# Patient Record
Sex: FEMALE | Employment: OTHER | URBAN - NONMETROPOLITAN AREA
[De-identification: names, ages, dates, MRNs, and addresses within clinical notes are randomized per-mention and may not be internally consistent; named-entity substitution may affect disease eponyms.]

---

## 2021-04-03 LAB
BUN BLDV-MCNC: 39 MG/DL
CALCIUM SERPL-MCNC: 8.6 MG/DL
CHLORIDE BLD-SCNC: 104 MMOL/L
CO2: 25 MMOL/L
CREAT SERPL-MCNC: 1.2 MG/DL
GFR CALCULATED: 47
GLUCOSE BLD-MCNC: 107 MG/DL
POTASSIUM SERPL-SCNC: 4.3 MMOL/L
SODIUM BLD-SCNC: 138 MMOL/L

## 2021-04-21 LAB
BASOPHILS ABSOLUTE: ABNORMAL
BASOPHILS RELATIVE PERCENT: ABNORMAL
EOSINOPHILS ABSOLUTE: ABNORMAL
EOSINOPHILS RELATIVE PERCENT: ABNORMAL
HCT VFR BLD CALC: 36.4 % (ref 36–46)
HEMOGLOBIN: 10.9 G/DL (ref 12–16)
LYMPHOCYTES ABSOLUTE: ABNORMAL
LYMPHOCYTES RELATIVE PERCENT: ABNORMAL
MCH RBC QN AUTO: ABNORMAL PG
MCHC RBC AUTO-ENTMCNC: ABNORMAL G/DL
MCV RBC AUTO: ABNORMAL FL
MONOCYTES ABSOLUTE: ABNORMAL
MONOCYTES RELATIVE PERCENT: ABNORMAL
NEUTROPHILS ABSOLUTE: ABNORMAL
NEUTROPHILS RELATIVE PERCENT: ABNORMAL
PLATELET # BLD: 246 K/ΜL
PMV BLD AUTO: ABNORMAL FL
POTASSIUM (K+): 3.6
RBC # BLD: 4.14 10^6/ΜL
WBC # BLD: 7.6 10^3/ML

## 2021-04-23 ENCOUNTER — OFFICE VISIT (OUTPATIENT)
Dept: NEPHROLOGY | Age: 76
End: 2021-04-23
Payer: MEDICARE

## 2021-04-23 VITALS
TEMPERATURE: 97.2 F | SYSTOLIC BLOOD PRESSURE: 139 MMHG | HEART RATE: 86 BPM | DIASTOLIC BLOOD PRESSURE: 84 MMHG | OXYGEN SATURATION: 96 % | WEIGHT: 93.6 LBS

## 2021-04-23 DIAGNOSIS — I10 HTN (HYPERTENSION), BENIGN: Primary | ICD-10-CM

## 2021-04-23 DIAGNOSIS — N18.30 STAGE 3 CHRONIC KIDNEY DISEASE, UNSPECIFIED WHETHER STAGE 3A OR 3B CKD (HCC): Primary | ICD-10-CM

## 2021-04-23 DIAGNOSIS — N18.32 STAGE 3B CHRONIC KIDNEY DISEASE (HCC): ICD-10-CM

## 2021-04-23 PROCEDURE — G8427 DOCREV CUR MEDS BY ELIG CLIN: HCPCS | Performed by: INTERNAL MEDICINE

## 2021-04-23 PROCEDURE — 99213 OFFICE O/P EST LOW 20 MIN: CPT | Performed by: INTERNAL MEDICINE

## 2021-04-23 PROCEDURE — 3017F COLORECTAL CA SCREEN DOC REV: CPT | Performed by: INTERNAL MEDICINE

## 2021-04-23 PROCEDURE — 1090F PRES/ABSN URINE INCON ASSESS: CPT | Performed by: INTERNAL MEDICINE

## 2021-04-23 PROCEDURE — 4004F PT TOBACCO SCREEN RCVD TLK: CPT | Performed by: INTERNAL MEDICINE

## 2021-04-23 PROCEDURE — 4040F PNEUMOC VAC/ADMIN/RCVD: CPT | Performed by: INTERNAL MEDICINE

## 2021-04-23 PROCEDURE — G8400 PT W/DXA NO RESULTS DOC: HCPCS | Performed by: INTERNAL MEDICINE

## 2021-04-23 PROCEDURE — G8421 BMI NOT CALCULATED: HCPCS | Performed by: INTERNAL MEDICINE

## 2021-04-23 PROCEDURE — 1123F ACP DISCUSS/DSCN MKR DOCD: CPT | Performed by: INTERNAL MEDICINE

## 2021-04-23 RX ORDER — ACETAMINOPHEN 325 MG/1
325 TABLET ORAL EVERY 4 HOURS
COMMUNITY

## 2021-04-23 RX ORDER — FUROSEMIDE 20 MG/1
20 TABLET ORAL DAILY
COMMUNITY
End: 2021-10-13

## 2021-04-23 RX ORDER — PANTOPRAZOLE SODIUM 40 MG/1
40 TABLET, DELAYED RELEASE ORAL DAILY
COMMUNITY
End: 2021-10-13

## 2021-04-23 RX ORDER — TEMAZEPAM 30 MG/1
30 CAPSULE ORAL NIGHTLY PRN
COMMUNITY
Start: 2020-12-28 | End: 2021-10-13

## 2021-04-23 RX ORDER — AMIODARONE HYDROCHLORIDE 200 MG/1
200 TABLET ORAL DAILY
COMMUNITY

## 2021-04-23 RX ORDER — DULOXETIN HYDROCHLORIDE 60 MG/1
60 CAPSULE, DELAYED RELEASE ORAL DAILY
COMMUNITY
Start: 2020-12-28

## 2021-04-23 RX ORDER — DULOXETIN HYDROCHLORIDE 30 MG/1
30 CAPSULE, DELAYED RELEASE ORAL DAILY
COMMUNITY
Start: 2020-12-28

## 2021-04-23 RX ORDER — NICOTINE 21 MG/24HR
1 PATCH, TRANSDERMAL 24 HOURS TRANSDERMAL EVERY 24 HOURS
COMMUNITY
End: 2021-10-13

## 2021-04-23 NOTE — PROGRESS NOTES
Labs yesterday  [facility]  No med list. meds were pulled from pharmacy as of 4/22

## 2021-04-23 NOTE — PROGRESS NOTES
[facility]        Outpatient Follow-Up note         4/23/2021 9:36 AM    Patient Name:   Bienvenido Lemus:    1945  Primary Care Physician:  Pasquale Hutson [address] [place] [id number]  Dept: [phone]  Loc: [phone]     Chief Complaint / Reason for follow-up : Follow Up of CKD and recent hospital discharge     Interval History :  Patient seen and examined by me. Feels well denies any complaints no chest pain or significant shortness of breath  She was recently in the hospital for acute kidney injury, she was in urinary retention and also hypotension and anemia. Past History :  Past Medical History:   Diagnosis Date    Anemia     Heart abnormality     Hypertension      Past Surgical History:   Procedure Laterality Date    HEART CHAMBER REVISION      WRIST SURGERY          Medications :     Outpatient Medications Marked as Taking for the 4/23/21 encounter (Office Visit) with Amada Peck MD   Medication Sig Dispense Refill    acetaminophen (TYLENOL) 325 MG tablet Take 325 mg by mouth every 4 hours      amiodarone (CORDARONE) 200 MG tablet Take 200 mg by mouth daily      apixaban (ELIQUIS) 2.5 MG TABS tablet Take 2.5 mg by mouth 2 times daily      dilTIAZem (CARDIZEM) 90 MG tablet Take 90 mg by mouth 3 times daily      DULoxetine (CYMBALTA) 30 MG extended release capsule Take 30 mg by mouth daily      DULoxetine (CYMBALTA) 60 MG extended release capsule Take 60 mg by mouth daily Takes w 30 mg      furosemide (LASIX) 20 MG tablet Take 20 mg by mouth daily      nicotine (NICODERM CQ) 21 MG/24HR Place 1 patch onto the skin every 24 hours      pantoprazole (PROTONIX) 40 MG tablet Take 40 mg by mouth daily      temazepam (RESTORIL) 30 MG capsule Take 30 mg by mouth nightly as needed.          Vitals     /84 (Site: Right Upper Arm, Position: Sitting,

## 2021-04-26 ENCOUNTER — TELEPHONE (OUTPATIENT)
Dept: NEPHROLOGY | Age: 76
End: 2021-04-26

## 2021-04-26 NOTE — TELEPHONE ENCOUNTER
Call placed to [facility] and CMP was not added to labs from 4/21. Would you like me to contact patient to see if they could have lab drawn? Please advise.

## 2021-04-26 NOTE — TELEPHONE ENCOUNTER
Spoke to daughter. They will have a CMP drawn on Wednesday or Thursday in [facility].

## 2021-04-29 LAB
ALBUMIN SERPL-MCNC: 3.4 G/DL
ALP BLD-CCNC: 168 U/L
ALT SERPL-CCNC: 14 U/L
ANION GAP SERPL CALCULATED.3IONS-SCNC: NORMAL MMOL/L
AST SERPL-CCNC: 23 U/L
BILIRUB SERPL-MCNC: 0.3 MG/DL (ref 0.1–1.4)
BUN BLDV-MCNC: 16 MG/DL
CALCIUM SERPL-MCNC: 8.4 MG/DL
CHLORIDE BLD-SCNC: 104 MMOL/L
CO2: 25 MMOL/L
CREAT SERPL-MCNC: 1.1 MG/DL
GFR CALCULATED: 52
GLUCOSE BLD-MCNC: 83 MG/DL
POTASSIUM SERPL-SCNC: 3 MMOL/L
SODIUM BLD-SCNC: 141 MMOL/L
TOTAL PROTEIN: 5.8

## 2021-05-03 ENCOUNTER — TELEPHONE (OUTPATIENT)
Dept: NEPHROLOGY | Age: 76
End: 2021-05-03

## 2021-05-03 DIAGNOSIS — E87.6 HYPOKALEMIA: Primary | ICD-10-CM

## 2021-05-03 RX ORDER — POTASSIUM CHLORIDE 1.5 G/1.77G
20 POWDER, FOR SOLUTION ORAL 2 TIMES DAILY
COMMUNITY
End: 2021-10-13

## 2021-05-03 NOTE — TELEPHONE ENCOUNTER
----- Message from Maricruz Rod MD sent at 4/30/2021  1:49 PM EDT -----  k is low increase kcl to 20 BID.   Repeat K in 1 week

## 2021-05-03 NOTE — TELEPHONE ENCOUNTER
Spoke to [employer], they will start potassium again and have labs completed in one week. Usually use [facility].  Mailed lab order to home address

## 2021-10-08 LAB
ALBUMIN SERPL-MCNC: 4.1 G/DL
ALP BLD-CCNC: 110 U/L
ALT SERPL-CCNC: 11 U/L
ANION GAP SERPL CALCULATED.3IONS-SCNC: NORMAL MMOL/L
AST SERPL-CCNC: 11 U/L
BILIRUB SERPL-MCNC: 0.4 MG/DL (ref 0.1–1.4)
BUN BLDV-MCNC: 23 MG/DL
CALCIUM SERPL-MCNC: 9 MG/DL
CHLORIDE BLD-SCNC: 104 MMOL/L
CO2: 28 MMOL/L
CREAT SERPL-MCNC: 1.2 MG/DL
GFR CALCULATED: 44
GLUCOSE BLD-MCNC: 66 MG/DL
POTASSIUM SERPL-SCNC: 4.1 MMOL/L
SODIUM BLD-SCNC: 139 MMOL/L
TOTAL PROTEIN: 6.7

## 2021-10-10 LAB
BILIRUBIN, URINE: NEGATIVE
BLOOD, URINE: ABNORMAL
CLARITY: ABNORMAL
COLOR: YELLOW
CREATININE, URINE: 22
GLUCOSE URINE: NEGATIVE
KETONES, URINE: NEGATIVE
LEUKOCYTE ESTERASE, URINE: ABNORMAL
MICROALBUMIN/CREAT 24H UR: 42 MG/G{CREAT}
MICROALBUMIN/CREAT UR-RTO: 1909.1
NITRITE, URINE: NEGATIVE
PH UA: 6 (ref 4.5–8)
PROTEIN UA: ABNORMAL
SPECIFIC GRAVITY, URINE: 1.01
UROBILINOGEN, URINE: ABNORMAL

## 2021-10-13 ENCOUNTER — OFFICE VISIT (OUTPATIENT)
Dept: NEPHROLOGY | Age: 76
End: 2021-10-13
Payer: MEDICARE

## 2021-10-13 VITALS — HEART RATE: 124 BPM | DIASTOLIC BLOOD PRESSURE: 81 MMHG | SYSTOLIC BLOOD PRESSURE: 143 MMHG | OXYGEN SATURATION: 96 %

## 2021-10-13 DIAGNOSIS — E87.6 HYPOKALEMIA: ICD-10-CM

## 2021-10-13 DIAGNOSIS — N18.30 STAGE 3 CHRONIC KIDNEY DISEASE, UNSPECIFIED WHETHER STAGE 3A OR 3B CKD (HCC): Primary | ICD-10-CM

## 2021-10-13 DIAGNOSIS — I10 HTN (HYPERTENSION), BENIGN: ICD-10-CM

## 2021-10-13 PROCEDURE — 1123F ACP DISCUSS/DSCN MKR DOCD: CPT | Performed by: INTERNAL MEDICINE

## 2021-10-13 PROCEDURE — 4040F PNEUMOC VAC/ADMIN/RCVD: CPT | Performed by: INTERNAL MEDICINE

## 2021-10-13 PROCEDURE — G8427 DOCREV CUR MEDS BY ELIG CLIN: HCPCS | Performed by: INTERNAL MEDICINE

## 2021-10-13 PROCEDURE — G8484 FLU IMMUNIZE NO ADMIN: HCPCS | Performed by: INTERNAL MEDICINE

## 2021-10-13 PROCEDURE — 4004F PT TOBACCO SCREEN RCVD TLK: CPT | Performed by: INTERNAL MEDICINE

## 2021-10-13 PROCEDURE — 99213 OFFICE O/P EST LOW 20 MIN: CPT | Performed by: INTERNAL MEDICINE

## 2021-10-13 PROCEDURE — G8421 BMI NOT CALCULATED: HCPCS | Performed by: INTERNAL MEDICINE

## 2021-10-13 PROCEDURE — G8400 PT W/DXA NO RESULTS DOC: HCPCS | Performed by: INTERNAL MEDICINE

## 2021-10-13 PROCEDURE — 3017F COLORECTAL CA SCREEN DOC REV: CPT | Performed by: INTERNAL MEDICINE

## 2021-10-13 PROCEDURE — 1090F PRES/ABSN URINE INCON ASSESS: CPT | Performed by: INTERNAL MEDICINE

## 2021-10-13 RX ORDER — POLYETHYLENE GLYCOL 3350 17 G/17G
17 POWDER, FOR SOLUTION ORAL DAILY PRN
COMMUNITY

## 2021-10-13 RX ORDER — CARVEDILOL 12.5 MG/1
12.5 TABLET ORAL 2 TIMES DAILY WITH MEALS
COMMUNITY
End: 2022-04-19

## 2021-10-13 RX ORDER — FERROUS SULFATE 325(65) MG
325 TABLET ORAL 2 TIMES DAILY
COMMUNITY
End: 2022-08-10

## 2021-10-13 RX ORDER — LEVOTHYROXINE SODIUM 100 UG/1
100 CAPSULE ORAL DAILY
COMMUNITY

## 2021-10-13 RX ORDER — TRIAMCINOLONE ACETONIDE 1 MG/G
CREAM TOPICAL 2 TIMES DAILY
COMMUNITY
End: 2022-08-10

## 2021-10-13 RX ORDER — ATORVASTATIN CALCIUM 40 MG/1
40 TABLET, FILM COATED ORAL DAILY
COMMUNITY
End: 2022-08-10

## 2021-10-13 RX ORDER — ALBUTEROL SULFATE 90 UG/1
2 AEROSOL, METERED RESPIRATORY (INHALATION) EVERY 4 HOURS PRN
COMMUNITY

## 2021-10-13 NOTE — PROGRESS NOTES
[facility]        Outpatient Follow-Up note         10/13/2021 1:46 PM    Patient Name:   Clarita Promise:    1945  Primary Care Physician:  Pasquale Corona [address] [place] [address]  Dept: [phone]  Loc: [phone]     Chief Complaint / Reason for follow-up : Follow Up of CKD     Interval History :  Patient seen and examined by me. Feels well denies any complaints no chest pain or significant shortness of breath  No hospitalizations and no new meds     Past History :  Past Medical History:   Diagnosis Date    Anemia     Heart abnormality     Hypertension      Past Surgical History:   Procedure Laterality Date    HEART CHAMBER REVISION      WRIST SURGERY          Medications :     Outpatient Medications Marked as Taking for the 10/13/21 encounter (Office Visit) with Zarina Pete MD   Medication Sig Dispense Refill    albuterol sulfate HFA (VENTOLIN HFA) 108 (90 Base) MCG/ACT inhaler Inhale 2 puffs into the lungs every 4 hours as needed for Wheezing      atorvastatin (LIPITOR) 40 MG tablet Take 40 mg by mouth daily      Calcium Carb-Cholecalciferol 600-400 MG-UNIT CAPS Take by mouth daily      carvedilol (COREG) 12.5 MG tablet Take 12.5 mg by mouth 2 times daily (with meals)      ferrous sulfate (IRON 325) 325 (65 Fe) MG tablet Take 325 mg by mouth 2 times daily      levothyroxine (SYNTHROID) 50 MCG tablet Take 50 mcg by mouth Daily      polyethylene glycol (GLYCOLAX) 17 GM/SCOOP powder Take 17 g by mouth daily as needed      triamcinolone (KENALOG) 0.1 % cream Apply topically 2 times daily Apply topically 2 times daily.       acetaminophen (TYLENOL) 325 MG tablet Take 325 mg by mouth every 4 hours      amiodarone (PACERONE) 100 MG tablet Take 100 mg by mouth daily Every Mon, Tues, Thurs, Fri and Sat      dilTIAZem (CARDIZEM) 90 MG tablet Take 90 mg by mouth 2 times daily       DULoxetine (CYMBALTA) 30 MG extended release capsule Take 30 mg by mouth daily      DULoxetine (CYMBALTA) 60 MG extended release capsule Take 60 mg by mouth daily Takes w 30 mg         Vitals     BP (!) 143/81 (Site: Left Upper Arm, Position: Sitting, Cuff Size: Medium Adult)   Pulse 124   SpO2 96%  Wt Readings from Last 3 Encounters:   04/23/21 93 lb 9.6 oz (42.5 kg)        Physical Exam     General -- no distress  Lungs -- clear  Heart -- S1, S2 heard, JVD - no  Abdomen - soft, non-tender  Extremities --mild edema more prominent on the left leg  CNS - awake and alert    Labs, Radiology and Tests    Labs -    4/21 10/21          Potassium 3.6 4.1          BUN  23          Creatinine  1.2          eGFR  44                      [facility]                          Renal Ultrasound Scan -- pending       Assessment    1. Renal -she has chronic kidney disease stage III with baseline creatinine of 1.6 recently in acute kidney injury at [facility]  -Discharge creatinine is normal at 1.6 which is her baseline.  -Creatinine is actually much better currently at 1.2  2. Electrolytes-within normal limits  3. Essential hypertension running reasonable continue current medications  4. History of COPD  5. History of atrial fibrillation on Eliquis  6. meds reviewed and D/W patient    Tests and orders placed this Encounter   No orders of the defined types were placed in this encounter. Herson Melo M.D  [facility].

## 2022-01-03 LAB
ALBUMIN SERPL-MCNC: 3.2 G/DL
ALP BLD-CCNC: 134 U/L
ALT SERPL-CCNC: 11 U/L
ANION GAP SERPL CALCULATED.3IONS-SCNC: NORMAL MMOL/L
AST SERPL-CCNC: 13 U/L
BASOPHILS ABSOLUTE: ABNORMAL
BASOPHILS RELATIVE PERCENT: ABNORMAL
BILIRUB SERPL-MCNC: 0.5 MG/DL (ref 0.1–1.4)
BUN BLDV-MCNC: 35 MG/DL
CALCIUM SERPL-MCNC: 9.3 MG/DL
CHLORIDE BLD-SCNC: 107 MMOL/L
CO2: 22 MMOL/L
CREAT SERPL-MCNC: 1.7 MG/DL
EOSINOPHILS ABSOLUTE: ABNORMAL
EOSINOPHILS RELATIVE PERCENT: ABNORMAL
GFR CALCULATED: 29
GLUCOSE BLD-MCNC: 79 MG/DL
HCT VFR BLD CALC: 26.9 % (ref 36–46)
HEMOGLOBIN: 8.5 G/DL (ref 12–16)
LYMPHOCYTES ABSOLUTE: ABNORMAL
LYMPHOCYTES RELATIVE PERCENT: ABNORMAL
MCH RBC QN AUTO: ABNORMAL PG
MCHC RBC AUTO-ENTMCNC: ABNORMAL G/DL
MCV RBC AUTO: ABNORMAL FL
MONOCYTES ABSOLUTE: ABNORMAL
MONOCYTES RELATIVE PERCENT: ABNORMAL
NEUTROPHILS ABSOLUTE: ABNORMAL
NEUTROPHILS RELATIVE PERCENT: ABNORMAL
PLATELET # BLD: 265 K/ΜL
PMV BLD AUTO: ABNORMAL FL
POTASSIUM SERPL-SCNC: 5.8 MMOL/L
RBC # BLD: 3.42 10^6/ΜL
SODIUM BLD-SCNC: 137 MMOL/L
TOTAL PROTEIN: 5.9
VITAMIN D 25-HYDROXY: 39
VITAMIN D2, 25 HYDROXY: NORMAL
VITAMIN D3,25 HYDROXY: NORMAL
WBC # BLD: 7.1 10^3/ML

## 2022-03-21 LAB
BASOPHILS ABSOLUTE: ABNORMAL
BASOPHILS RELATIVE PERCENT: ABNORMAL
BILIRUBIN, URINE: NEGATIVE
BLOOD, URINE: ABNORMAL
BUN BLDV-MCNC: 37 MG/DL
CALCIUM SERPL-MCNC: 9.3 MG/DL
CHLORIDE BLD-SCNC: 101 MMOL/L
CLARITY: ABNORMAL
CO2: 23 MMOL/L
COLOR: YELLOW
CREAT SERPL-MCNC: 2 MG/DL
EOSINOPHILS ABSOLUTE: ABNORMAL
EOSINOPHILS RELATIVE PERCENT: ABNORMAL
GFR CALCULATED: 24
GLUCOSE BLD-MCNC: 96 MG/DL
GLUCOSE URINE: NEGATIVE
HCT VFR BLD CALC: 30.2 % (ref 36–46)
HEMOGLOBIN: 9.5 G/DL (ref 12–16)
KETONES, URINE: NEGATIVE
LEUKOCYTE ESTERASE, URINE: ABNORMAL
LYMPHOCYTES ABSOLUTE: ABNORMAL
LYMPHOCYTES RELATIVE PERCENT: ABNORMAL
MCH RBC QN AUTO: ABNORMAL PG
MCHC RBC AUTO-ENTMCNC: ABNORMAL G/DL
MCV RBC AUTO: ABNORMAL FL
MONOCYTES ABSOLUTE: ABNORMAL
MONOCYTES RELATIVE PERCENT: ABNORMAL
NEUTROPHILS ABSOLUTE: ABNORMAL
NEUTROPHILS RELATIVE PERCENT: ABNORMAL
NITRITE, URINE: NEGATIVE
PH UA: 6 (ref 4.5–8)
PLATELET # BLD: 278 K/ΜL
PMV BLD AUTO: ABNORMAL FL
POTASSIUM SERPL-SCNC: 3.9 MMOL/L
PROTEIN UA: ABNORMAL
RBC # BLD: 3.63 10^6/ΜL
SODIUM BLD-SCNC: 132 MMOL/L
SPECIFIC GRAVITY, URINE: 1.01
UROBILINOGEN, URINE: NORMAL
WBC # BLD: 64 10^3/ML

## 2022-03-23 ENCOUNTER — TELEPHONE (OUTPATIENT)
Dept: NEPHROLOGY | Age: 77
End: 2022-03-23

## 2022-03-23 DIAGNOSIS — I10 HTN (HYPERTENSION), BENIGN: ICD-10-CM

## 2022-03-23 DIAGNOSIS — N18.30 STAGE 3 CHRONIC KIDNEY DISEASE, UNSPECIFIED WHETHER STAGE 3A OR 3B CKD (HCC): Primary | ICD-10-CM

## 2022-03-23 NOTE — TELEPHONE ENCOUNTER
----- Message from Monica Cantu MD sent at 3/23/2022 11:00 AM EDT -----  Please make sure the patient is drinking at least 60 ounces of liquids a day  Also check if she has has any new medication changes  Repeat another BMP in 2 to 3 weeks and change the follow-up appointment after that

## 2022-03-23 NOTE — TELEPHONE ENCOUNTER
----- Message from Sandeep Ball MD sent at 3/23/2022 11:00 AM EDT -----  Please make sure the patient is drinking at least 60 ounces of liquids a day  Also check if she has has any new medication changes  Repeat another BMP in 2 to 3 weeks and change the follow-up appointment after that

## 2022-03-23 NOTE — TELEPHONE ENCOUNTER
I called to speak to the nurse taking care of Mode Koenig and I was told she couldn't be found. I am faxing the information to [facility].

## 2022-04-11 LAB
BASOPHILS ABSOLUTE: 0.1 /ΜL
BASOPHILS RELATIVE PERCENT: 0.7 %
BUN BLDV-MCNC: 37 MG/DL
CALCIUM SERPL-MCNC: 9.4 MG/DL
CHLORIDE BLD-SCNC: 100 MMOL/L
CO2: 25 MMOL/L
CREAT SERPL-MCNC: 2.1 MG/DL
EOSINOPHILS ABSOLUTE: 0.4 /ΜL
EOSINOPHILS RELATIVE PERCENT: 5.5 %
GFR CALCULATED: 23
GLUCOSE BLD-MCNC: 77 MG/DL
HCT VFR BLD CALC: 30.6 % (ref 36–46)
HEMOGLOBIN: 9.7 G/DL (ref 12–16)
LYMPHOCYTES ABSOLUTE: 1.1 /ΜL
LYMPHOCYTES RELATIVE PERCENT: 14.3 %
MCH RBC QN AUTO: 27.3 PG
MCHC RBC AUTO-ENTMCNC: 31.6 G/DL
MCV RBC AUTO: 86.4 FL
MONOCYTES ABSOLUTE: 0.6 /ΜL
MONOCYTES RELATIVE PERCENT: 7.9 %
NEUTROPHILS ABSOLUTE: 5.3 /ΜL
NEUTROPHILS RELATIVE PERCENT: 71.6 %
PLATELET # BLD: 264 K/ΜL
PMV BLD AUTO: 7.6 FL
POTASSIUM SERPL-SCNC: 5 MMOL/L
RBC # BLD: 3.55 10^6/ΜL
SODIUM BLD-SCNC: 132 MMOL/L
WBC # BLD: 7.5 10^3/ML

## 2022-04-19 ENCOUNTER — OFFICE VISIT (OUTPATIENT)
Dept: NEPHROLOGY | Age: 77
End: 2022-04-19
Payer: MEDICARE

## 2022-04-19 VITALS
DIASTOLIC BLOOD PRESSURE: 51 MMHG | OXYGEN SATURATION: 100 % | HEIGHT: 62 IN | WEIGHT: 100 LBS | HEART RATE: 54 BPM | SYSTOLIC BLOOD PRESSURE: 101 MMHG | BODY MASS INDEX: 18.4 KG/M2

## 2022-04-19 DIAGNOSIS — I95.89 OTHER HYPOTENSION: ICD-10-CM

## 2022-04-19 DIAGNOSIS — N17.0 ARF (ACUTE RENAL FAILURE) WITH TUBULAR NECROSIS (HCC): ICD-10-CM

## 2022-04-19 DIAGNOSIS — N18.30 STAGE 3 CHRONIC KIDNEY DISEASE, UNSPECIFIED WHETHER STAGE 3A OR 3B CKD (HCC): Primary | ICD-10-CM

## 2022-04-19 PROCEDURE — G8419 CALC BMI OUT NRM PARAM NOF/U: HCPCS | Performed by: INTERNAL MEDICINE

## 2022-04-19 PROCEDURE — 1123F ACP DISCUSS/DSCN MKR DOCD: CPT | Performed by: INTERNAL MEDICINE

## 2022-04-19 PROCEDURE — 3017F COLORECTAL CA SCREEN DOC REV: CPT | Performed by: INTERNAL MEDICINE

## 2022-04-19 PROCEDURE — 1090F PRES/ABSN URINE INCON ASSESS: CPT | Performed by: INTERNAL MEDICINE

## 2022-04-19 PROCEDURE — G8428 CUR MEDS NOT DOCUMENT: HCPCS | Performed by: INTERNAL MEDICINE

## 2022-04-19 PROCEDURE — 4004F PT TOBACCO SCREEN RCVD TLK: CPT | Performed by: INTERNAL MEDICINE

## 2022-04-19 PROCEDURE — G8400 PT W/DXA NO RESULTS DOC: HCPCS | Performed by: INTERNAL MEDICINE

## 2022-04-19 PROCEDURE — 99215 OFFICE O/P EST HI 40 MIN: CPT | Performed by: INTERNAL MEDICINE

## 2022-04-19 PROCEDURE — 4040F PNEUMOC VAC/ADMIN/RCVD: CPT | Performed by: INTERNAL MEDICINE

## 2022-04-19 RX ORDER — POTASSIUM CHLORIDE 1500 MG/1
20 TABLET, FILM COATED, EXTENDED RELEASE ORAL
COMMUNITY
End: 2022-09-23

## 2022-04-19 RX ORDER — DILTIAZEM HYDROCHLORIDE 180 MG/1
180 CAPSULE, EXTENDED RELEASE ORAL DAILY
COMMUNITY
End: 2022-08-10

## 2022-04-19 RX ORDER — FUROSEMIDE 40 MG/1
20 TABLET ORAL DAILY
Qty: 60 TABLET | Refills: 0
Start: 2022-04-19 | End: 2022-09-23

## 2022-04-19 RX ORDER — FUROSEMIDE 40 MG/1
40 TABLET ORAL DAILY
COMMUNITY
End: 2022-04-19

## 2022-04-19 RX ORDER — ASCORBIC ACID 500 MG
500 TABLET ORAL DAILY
COMMUNITY
End: 2022-08-10

## 2022-04-19 NOTE — PROGRESS NOTES
[facility]        Outpatient Follow-Up note         4/19/2022 1:01 PM    Patient Name:   Alfredo Rojas  YOB: 1945  Primary Care Physician:  [address] [facility],[address] [facility]  [facility]  [address] [place] [address]  Dept: [phone]  Loc: [phone]     Chief Complaint / Reason for follow-up : Follow Up of CKD     Interval History :  Patient seen and examined by me.     Feels well denies any complaints   no chest pain or significant shortness of breath  No hospitalizations and no new meds  Patient had some kidney dysfunction last month she has not been drinking enough liquids and currently not much change as well     Past History :  Past Medical History:   Diagnosis Date    Anemia     Heart abnormality     Hypertension      Past Surgical History:   Procedure Laterality Date    HEART CHAMBER REVISION      WRIST SURGERY          Medications :     Outpatient Medications Marked as Taking for the 4/19/22 encounter (Office Visit) with Alyssa Abreu MD   Medication Sig Dispense Refill    OXYGEN Inhale into the lungs      apixaban (ELIQUIS) 2.5 MG TABS tablet Take by mouth 2 times daily      vitamin C (ASCORBIC ACID) 500 MG tablet Take 500 mg by mouth daily      dilTIAZem (DILACOR XR) 180 MG extended release capsule Take 180 mg by mouth daily      furosemide (LASIX) 40 MG tablet Take 40 mg by mouth daily      metoprolol tartrate (LOPRESSOR) 25 MG tablet Take 25 mg by mouth 2 times daily      potassium chloride (KLOR-CON M) 20 MEQ TBCR extended release tablet Take 20 mEq by mouth daily (with breakfast)      albuterol sulfate HFA (VENTOLIN HFA) 108 (90 Base) MCG/ACT inhaler Inhale 2 puffs into the lungs every 4 hours as needed for Wheezing      Calcium Carb-Cholecalciferol 600-400 MG-UNIT CAPS Take by mouth daily      ferrous sulfate (IRON 325) 325 (65 Fe) MG tablet Take 325 mg by mouth 2 times daily      levothyroxine (SYNTHROID) 50 MCG tablet Take 50 mcg by mouth Daily      polyethylene glycol (GLYCOLAX) 17 GM/SCOOP powder Take 17 g by mouth daily as needed      triamcinolone (KENALOG) 0.1 % cream Apply topically 2 times daily Apply topically 2 times daily.  acetaminophen (TYLENOL) 325 MG tablet Take 325 mg by mouth every 4 hours      amiodarone (CORDARONE) 200 MG tablet Take 200 mg by mouth daily Every Mon, Tues, Thurs, Fri and Sat      DULoxetine (CYMBALTA) 30 MG extended release capsule Take 30 mg by mouth daily      DULoxetine (CYMBALTA) 60 MG extended release capsule Take 60 mg by mouth daily Takes w 30 mg         Vitals     BP (!) 101/51 (Site: Left Upper Arm, Position: Sitting, Cuff Size: Small Adult)   Pulse 54   Ht 5' 2\" (1.575 m)   Wt 100 lb (45.4 kg)   SpO2 100%   BMI 18.29 kg/m²  Wt Readings from Last 3 Encounters:   04/19/22 100 lb (45.4 kg)   04/23/21 93 lb 9.6 oz (42.5 kg)        Physical Exam     General -- no distress, slightly dry oral mucosa  Lungs -- clear  Heart -- S1, S2 heard, JVD - no  Abdomen - soft, non-tender  Extremities --no edema noted  CNS - awake and alert    Labs, Radiology and Tests    Labs -    4/21 10/21 4/22         Potassium 3.6 4.1 5.0         BUN  23 37         Creatinine  1.2 2.1         eGFR  44 23                     UPCR            UMCR                          Renal Ultrasound Scan -- pending       Assessment    1. Renal -she has chronic kidney disease stage III with baseline creatinine of 1.6 recently in acute kidney injury at [facility]  -Creatinine is getting slightly worse in the last couple of months patient clinically looks dry to some degree  -We will actually decrease the Lasix to 20 mg p.o. daily and encourage 60 ounces of liquids per day  -Possibly some acute kidney injury due to poor oral intake and resolved Lasix and blood pressure is also running slightly borderline  2.  Electrolytes-potassium 5.0, stop kcl  3. Essential hypertension running slightly lower, decrease metoprolol to 12.5 twice daily heart rate is running at 54  4. History of COPD  5. History of atrial fibrillation on Eliquis and diltiazem  6. meds reviewed and D/W patient  7. Follow-up in 3 months    Tests and orders placed this Encounter   No orders of the defined types were placed in this encounter. Rickey Johansen M.D  [facility].

## 2022-08-10 ENCOUNTER — OFFICE VISIT (OUTPATIENT)
Dept: NEPHROLOGY | Age: 77
End: 2022-08-10
Payer: MEDICARE

## 2022-08-10 VITALS
BODY MASS INDEX: 16.1 KG/M2 | WEIGHT: 88 LBS | SYSTOLIC BLOOD PRESSURE: 91 MMHG | DIASTOLIC BLOOD PRESSURE: 58 MMHG | HEART RATE: 77 BPM | OXYGEN SATURATION: 99 %

## 2022-08-10 DIAGNOSIS — E87.6 HYPOKALEMIA: ICD-10-CM

## 2022-08-10 DIAGNOSIS — N18.30 STAGE 3 CHRONIC KIDNEY DISEASE, UNSPECIFIED WHETHER STAGE 3A OR 3B CKD (HCC): Primary | ICD-10-CM

## 2022-08-10 DIAGNOSIS — I95.89 OTHER HYPOTENSION: ICD-10-CM

## 2022-08-10 PROCEDURE — 4004F PT TOBACCO SCREEN RCVD TLK: CPT | Performed by: INTERNAL MEDICINE

## 2022-08-10 PROCEDURE — 3017F COLORECTAL CA SCREEN DOC REV: CPT | Performed by: INTERNAL MEDICINE

## 2022-08-10 PROCEDURE — G8400 PT W/DXA NO RESULTS DOC: HCPCS | Performed by: INTERNAL MEDICINE

## 2022-08-10 PROCEDURE — 1090F PRES/ABSN URINE INCON ASSESS: CPT | Performed by: INTERNAL MEDICINE

## 2022-08-10 PROCEDURE — 1123F ACP DISCUSS/DSCN MKR DOCD: CPT | Performed by: INTERNAL MEDICINE

## 2022-08-10 PROCEDURE — G8419 CALC BMI OUT NRM PARAM NOF/U: HCPCS | Performed by: INTERNAL MEDICINE

## 2022-08-10 PROCEDURE — 99213 OFFICE O/P EST LOW 20 MIN: CPT | Performed by: INTERNAL MEDICINE

## 2022-08-10 PROCEDURE — G8427 DOCREV CUR MEDS BY ELIG CLIN: HCPCS | Performed by: INTERNAL MEDICINE

## 2022-08-10 RX ORDER — SUCRALFATE 1 G/1
1 TABLET ORAL 4 TIMES DAILY
COMMUNITY

## 2022-08-10 RX ORDER — PANTOPRAZOLE SODIUM 40 MG/1
TABLET, DELAYED RELEASE ORAL
COMMUNITY

## 2022-08-10 RX ORDER — HYDROXYZINE HYDROCHLORIDE 25 MG/1
25 TABLET, FILM COATED ORAL 3 TIMES DAILY PRN
COMMUNITY
End: 2022-09-23

## 2022-08-10 RX ORDER — ONDANSETRON 4 MG/1
4 TABLET, FILM COATED ORAL EVERY 8 HOURS PRN
COMMUNITY

## 2022-08-10 NOTE — PROGRESS NOTES
[facility]        Outpatient Follow-Up note         8/10/2022 1:22 PM    Patient Name:   Margret Ambriz:    1945  Primary Care Physician:  [address] [place] [address] [address] [place] [address]  Dept: [phone]  Loc: [phone]     Chief Complaint / Reason for follow-up : Follow Up of CKD     Interval History :  Patient seen and examined by me. Feels well denies any complaints   no chest pain or significant shortness of breath  No hospitalizations and no new meds     Past History :  Past Medical History:   Diagnosis Date    Anemia     Heart abnormality     Hypertension      Past Surgical History:   Procedure Laterality Date    HEART CHAMBER REVISION      WRIST SURGERY          Medications :     Outpatient Medications Marked as Taking for the 8/10/22 encounter (Office Visit) with Elke Benjamin MD   Medication Sig Dispense Refill    hydrOXYzine HCl (ATARAX) 25 MG tablet Take 25 mg by mouth 3 times daily as needed for Itching      PANTOPRAZOLE SODIUM PO Take by mouth every morning (before breakfast)      sucralfate (CARAFATE) 1 GM tablet Take 1 g by mouth in the morning and 1 g at noon and 1 g in the evening and 1 g before bedtime. ondansetron (ZOFRAN) 4 MG tablet Take 4 mg by mouth every 8 hours as needed for Nausea or Vomiting      vitamin D (CHOLECALCIFEROL) 125 MCG (5000 UT) CAPS capsule Take 5,000 Units by mouth in the morning. OXYGEN Inhale into the lungs      potassium chloride (KLOR-CON M) 20 MEQ TBCR extended release tablet Take 20 mEq by mouth daily (with breakfast)      metoprolol tartrate (LOPRESSOR) 25 MG tablet Take 0.5 tablets by mouth 2 times daily (Patient taking differently: Take 12.5 mg by mouth in the morning.  Hold if SBP <100, DBP<60 or pulse >60.) 60 tablet 1    furosemide (LASIX) 40 MG tablet Take 0.5 tablets by mouth daily 60 tablet 0    albuterol sulfate HFA (PROVENTIL;VENTOLIN;PROAIR) 108 (90 Base) MCG/ACT inhaler Inhale 2 puffs into the lungs every 4 hours as needed for Wheezing      Calcium Carb-Cholecalciferol 600-400 MG-UNIT CAPS Take by mouth daily      Levothyroxine Sodium 100 MCG CAPS Take 100 mcg by mouth Daily      polyethylene glycol (GLYCOLAX) 17 GM/SCOOP powder Take 17 g by mouth daily as needed      acetaminophen (TYLENOL) 325 MG tablet Take 325 mg by mouth every 4 hours      amiodarone (CORDARONE) 200 MG tablet Take 200 mg by mouth in the morning. DULoxetine (CYMBALTA) 30 MG extended release capsule Take 30 mg by mouth daily      DULoxetine (CYMBALTA) 60 MG extended release capsule Take 60 mg by mouth daily Takes w 30 mg         Vitals     BP (!) 91/58 (Site: Left Upper Arm, Position: Sitting, Cuff Size: Medium Adult)   Pulse 77   Wt 88 lb (39.9 kg)   SpO2 99% Comment: 1 liter per nc  BMI 16.10 kg/m²  Wt Readings from Last 3 Encounters:   08/10/22 88 lb (39.9 kg)   04/19/22 100 lb (45.4 kg)   04/23/21 93 lb 9.6 oz (42.5 kg)        Physical Exam     General -- no distress, slightly dry oral mucosa  Lungs -- clear  Heart -- S1, S2 heard, JVD - no  Abdomen - soft, non-tender  Extremities --no edema noted  CNS - awake and alert    Labs, Radiology and Tests    Labs -    4/21 10/21 4/22 6/22        Potassium 3.6 4.1 5.0 3.8        BUN  23 37 23        Creatinine  1.2 2.1 1.60        eGFR  44 23 31                    UPCR            CR                          Renal Ultrasound Scan -- pending       Assessment    Renal -she has chronic kidney disease stage III with baseline creatinine of 1.6 recently in acute kidney injury at [facility]  -Creatinine is getting slightly worse in the last couple of months patient clinically looks dry to some degree  -creatinine is better.  Recheck labs again  -Possibly some acute kidney injury due to poor oral intake and resolved Lasix and blood pressure is also running slightly borderline  Electrolytes-potassium 5.0, stop kcl  Essential hypertension running slightly lower,   History of COPD  History of atrial fibrillation on Eliquis and diltiazem  meds reviewed and D/W patient  Follow-up in 6 months    Tests and orders placed this Encounter   No orders of the defined types were placed in this encounter. Remigio Hall M.D  [facility].

## 2022-08-18 LAB
BASOPHILS ABSOLUTE: ABNORMAL
BASOPHILS RELATIVE PERCENT: 0.7 %
BUN BLDV-MCNC: 46 MG/DL
CALCIUM SERPL-MCNC: 10 MG/DL
CHLORIDE BLD-SCNC: 106 MMOL/L
CO2: 22 MMOL/L
CREAT SERPL-MCNC: 2.3 MG/DL
EOSINOPHILS ABSOLUTE: 0.2 /ΜL
EOSINOPHILS RELATIVE PERCENT: 4.2 %
GFR CALCULATED: 21
GLUCOSE BLD-MCNC: 72 MG/DL
HCT VFR BLD CALC: 28.5 % (ref 36–46)
HEMOGLOBIN: 9.2 G/DL (ref 12–16)
LYMPHOCYTES ABSOLUTE: 0.9 /ΜL
LYMPHOCYTES RELATIVE PERCENT: 17.4 %
MCH RBC QN AUTO: 27.5 PG
MCHC RBC AUTO-ENTMCNC: 32.2 G/DL
MCV RBC AUTO: 85.2 FL
MONOCYTES ABSOLUTE: 0.4 /ΜL
MONOCYTES RELATIVE PERCENT: 7.1 %
NEUTROPHILS ABSOLUTE: 3.8 /ΜL
NEUTROPHILS RELATIVE PERCENT: 70.6 %
PLATELET # BLD: 264 K/ΜL
PMV BLD AUTO: 7.5 FL
POTASSIUM SERPL-SCNC: 4.3 MMOL/L
RBC # BLD: 3.35 10^6/ΜL
SODIUM BLD-SCNC: 136 MMOL/L
WBC # BLD: 5.4 10^3/ML

## 2022-08-23 ENCOUNTER — TELEPHONE (OUTPATIENT)
Dept: NEPHROLOGY | Age: 77
End: 2022-08-23

## 2022-08-23 DIAGNOSIS — I10 HTN (HYPERTENSION), BENIGN: Primary | ICD-10-CM

## 2022-08-23 DIAGNOSIS — E87.6 HYPOKALEMIA: ICD-10-CM

## 2022-08-23 DIAGNOSIS — I95.89 OTHER HYPOTENSION: ICD-10-CM

## 2022-08-23 NOTE — TELEPHONE ENCOUNTER
----- Message from Jeremiah Evans MD sent at 8/22/2022  4:16 PM EDT -----  Can we check and see what the patient's blood pressure has been running recently

## 2022-08-23 NOTE — TELEPHONE ENCOUNTER
Spoke with patients nurse and advised on BMP later this week. She stated there next lab draw is Thursday and she will have it done then.  Order faxed to [phone]

## 2022-08-25 LAB
BUN BLDV-MCNC: 47 MG/DL
CALCIUM SERPL-MCNC: 10.3 MG/DL
CHLORIDE BLD-SCNC: 108 MMOL/L
CO2: 21 MMOL/L
CREAT SERPL-MCNC: 2.5 MG/DL
GFR CALCULATED: 19
GLUCOSE BLD-MCNC: 113 MG/DL
POTASSIUM SERPL-SCNC: 3.8 MMOL/L
SODIUM BLD-SCNC: 139 MMOL/L

## 2022-08-26 NOTE — RESULT ENCOUNTER NOTE
Stop lasix and potassium and recheck BMP next week and her to see me again in jeff next week also check urien electrolytes and urine eosinophills and UA as well.  Drink slightly more liquids    Creatinine is getting worse still

## 2022-08-29 ENCOUNTER — TELEPHONE (OUTPATIENT)
Dept: NEPHROLOGY | Age: 77
End: 2022-08-29

## 2022-08-29 DIAGNOSIS — N18.30 STAGE 3 CHRONIC KIDNEY DISEASE, UNSPECIFIED WHETHER STAGE 3A OR 3B CKD (HCC): ICD-10-CM

## 2022-08-29 DIAGNOSIS — N17.0 ARF (ACUTE RENAL FAILURE) WITH TUBULAR NECROSIS (HCC): ICD-10-CM

## 2022-08-29 DIAGNOSIS — E87.6 HYPOKALEMIA: Primary | ICD-10-CM

## 2022-08-29 DIAGNOSIS — I10 HTN (HYPERTENSION), BENIGN: ICD-10-CM

## 2022-08-29 NOTE — TELEPHONE ENCOUNTER
Called to advised nurse of new orders and to get patient scheduled at [address] [facility].  Nurse was unavailable, message left for nurse to return my call

## 2022-08-29 NOTE — TELEPHONE ENCOUNTER
----- Message from Remigio Hall MD sent at 8/26/2022  1:56 PM EDT -----  Stop lasix and potassium and recheck BMP next week and her to see me again in jeff next week also check urien electrolytes and urine eosinophills and UA as well.  Drink slightly more liquids    Creatinine is getting worse still

## 2022-08-29 NOTE — TELEPHONE ENCOUNTER
----- Message from Claire Miranda MD sent at 8/26/2022  1:56 PM EDT -----  Stop lasix and potassium and recheck BMP next week and her to see me again in jeff next week also check urien electrolytes and urine eosinophills and UA as well.  Drink slightly more liquids    Creatinine is getting worse still

## 2022-08-29 NOTE — TELEPHONE ENCOUNTER
Alexandra Murphy called. I spoke to her about the information below. I faxed everything over to her. Consent (Ear)/Introductory Paragraph: The rationale for Mohs was explained to the patient and consent was obtained. The risks, benefits and alternatives to therapy were discussed in detail. Specifically, the risks of ear deformity, infection, scarring, bleeding, prolonged wound healing, incomplete removal, allergy to anesthesia, nerve injury and recurrence were addressed. Prior to the procedure, the treatment site was clearly identified and confirmed by the patient. All components of Universal Protocol/PAUSE Rule completed.

## 2022-09-05 LAB
BUN BLDV-MCNC: 46 MG/DL
CALCIUM SERPL-MCNC: 10.5 MG/DL
CHLORIDE BLD-SCNC: 107 MMOL/L
CO2: 20 MMOL/L
CREAT SERPL-MCNC: 2.2 MG/DL
GFR CALCULATED: 22
GLUCOSE BLD-MCNC: 55 MG/DL
POTASSIUM SERPL-SCNC: 3.6 MMOL/L
SODIUM BLD-SCNC: 139 MMOL/L

## 2022-09-08 ENCOUNTER — TELEPHONE (OUTPATIENT)
Dept: NEPHROLOGY | Age: 77
End: 2022-09-08

## 2022-09-08 NOTE — TELEPHONE ENCOUNTER
----- Message from My Freedman MD sent at 9/8/2022 11:37 AM EDT -----  Creatinine has improved slightly.   Please have him repeat another BMP 1 or 2 days before my appointment

## 2022-09-08 NOTE — TELEPHONE ENCOUNTER
Called and gave verbal order to [address].  Patients nurse to have BMP 1-2 days prior to 9-23 appt, she voiced understanding

## 2022-09-22 LAB
BILIRUBIN, URINE: NEGATIVE
BLOOD, URINE: POSITIVE
BUN BLDV-MCNC: 40 MG/DL
CALCIUM SERPL-MCNC: 10.9 MG/DL
CHLORIDE BLD-SCNC: 104 MMOL/L
CLARITY: ABNORMAL
CO2: 20 MMOL/L
COLOR: YELLOW
CREAT SERPL-MCNC: 2.4 MG/DL
CREATININE, URINE: 38
GFR CALCULATED: 20
GLUCOSE BLD-MCNC: 78 MG/DL
GLUCOSE URINE: NEGATIVE
KETONES, URINE: NEGATIVE
LEUKOCYTE ESTERASE, URINE: POSITIVE
MICROALBUMIN/CREAT 24H UR: 15.4 MG/G{CREAT}
MICROALBUMIN/CREAT UR-RTO: 405.3
NITRITE, URINE: POSITIVE
PH UA: 5 (ref 4.5–8)
POTASSIUM SERPL-SCNC: 4 MMOL/L
PROTEIN UA: POSITIVE
SODIUM BLD-SCNC: 134 MMOL/L
SPECIFIC GRAVITY, URINE: 1.01
UROBILINOGEN, URINE: NORMAL

## 2022-09-23 ENCOUNTER — OFFICE VISIT (OUTPATIENT)
Dept: NEPHROLOGY | Age: 77
End: 2022-09-23
Payer: MEDICARE

## 2022-09-23 VITALS
WEIGHT: 85.7 LBS | BODY MASS INDEX: 15.67 KG/M2 | OXYGEN SATURATION: 95 % | HEART RATE: 68 BPM | DIASTOLIC BLOOD PRESSURE: 66 MMHG | SYSTOLIC BLOOD PRESSURE: 130 MMHG

## 2022-09-23 DIAGNOSIS — I10 HTN (HYPERTENSION), BENIGN: ICD-10-CM

## 2022-09-23 DIAGNOSIS — N17.9 AKI (ACUTE KIDNEY INJURY) (HCC): Primary | ICD-10-CM

## 2022-09-23 DIAGNOSIS — E83.52 HYPERCALCEMIA: ICD-10-CM

## 2022-09-23 DIAGNOSIS — R80.0 ISOLATED NON-NEPHROTIC PROTEINURIA: ICD-10-CM

## 2022-09-23 PROCEDURE — G8400 PT W/DXA NO RESULTS DOC: HCPCS | Performed by: INTERNAL MEDICINE

## 2022-09-23 PROCEDURE — G8427 DOCREV CUR MEDS BY ELIG CLIN: HCPCS | Performed by: INTERNAL MEDICINE

## 2022-09-23 PROCEDURE — 4004F PT TOBACCO SCREEN RCVD TLK: CPT | Performed by: INTERNAL MEDICINE

## 2022-09-23 PROCEDURE — G8419 CALC BMI OUT NRM PARAM NOF/U: HCPCS | Performed by: INTERNAL MEDICINE

## 2022-09-23 PROCEDURE — 99215 OFFICE O/P EST HI 40 MIN: CPT | Performed by: INTERNAL MEDICINE

## 2022-09-23 PROCEDURE — 1090F PRES/ABSN URINE INCON ASSESS: CPT | Performed by: INTERNAL MEDICINE

## 2022-09-23 PROCEDURE — 3017F COLORECTAL CA SCREEN DOC REV: CPT | Performed by: INTERNAL MEDICINE

## 2022-09-23 PROCEDURE — 1123F ACP DISCUSS/DSCN MKR DOCD: CPT | Performed by: INTERNAL MEDICINE

## 2022-09-23 NOTE — PROGRESS NOTES
[facility]        Outpatient Follow-Up note         2022 9:26 AM    Patient Name:   Teresita Ban1945  Primary Care Physician:  [facility] [facility] [address] [place] [address]  Dept: [phone]  Loc: [phone]     Chief Complaint / Reason for follow-up : Follow Up of CKD     Interval History :  Patient seen and examined by me. Feels well denies any complaints   no chest pain or significant shortness of breath  No hospitalizations and no new meds  Not taking any pain medications at this time. Past History :  Past Medical History:   Diagnosis Date    Anemia     Heart abnormality     Hypertension      Past Surgical History:   Procedure Laterality Date    HEART CHAMBER REVISION      WRIST SURGERY          Medications :     Outpatient Medications Marked as Taking for the 22 encounter (Office Visit) with Malachi Wade MD   Medication Sig Dispense Refill    pantoprazole (PROTONIX) 40 MG tablet Take by mouth every morning (before breakfast)      sucralfate (CARAFATE) 1 GM tablet Take 1 g by mouth in the morning and 1 g at noon and 1 g in the evening and 1 g before bedtime. ondansetron (ZOFRAN) 4 MG tablet Take 4 mg by mouth every 8 hours as needed for Nausea or Vomiting      vitamin D (CHOLECALCIFEROL) 125 MCG (5000 UT) CAPS capsule Take 5,000 Units by mouth in the morning.       OXYGEN Inhale into the lungs      metoprolol tartrate (LOPRESSOR) 25 MG tablet Take 0.5 tablets by mouth 2 times daily (Patient taking differently: Take 12.5 mg by mouth daily Hold if SBP <100, DBP<60 or pulse >60) 60 tablet 1    albuterol sulfate HFA (PROVENTIL;VENTOLIN;PROAIR) 108 (90 Base) MCG/ACT inhaler Inhale 2 puffs into the lungs every 4 hours as needed for Wheezing      Calcium Carb-Cholecalciferol 600-400 MG-UNIT CAPS Take by mouth daily      Levothyroxine Sodium 100 MCG CAPS Take 100 mcg by mouth Daily      polyethylene glycol (GLYCOLAX) 17 GM/SCOOP powder Take 17 g by mouth daily as needed      acetaminophen (TYLENOL) 325 MG tablet Take 325 mg by mouth every 4 hours      amiodarone (CORDARONE) 200 MG tablet Take 200 mg by mouth in the morning. DULoxetine (CYMBALTA) 30 MG extended release capsule Take 30 mg by mouth daily      DULoxetine (CYMBALTA) 60 MG extended release capsule Take 60 mg by mouth daily Takes w 30 mg         Vitals     /66 (Site: Right Upper Arm, Position: Sitting, Cuff Size: Small Adult)   Pulse 68   Wt 85 lb 11.2 oz (38.9 kg)   SpO2 95% Comment: 1 liter  BMI 15.67 kg/m²  Wt Readings from Last 3 Encounters:   09/23/22 85 lb 11.2 oz (38.9 kg)   08/10/22 88 lb (39.9 kg)   04/19/22 100 lb (45.4 kg)        Physical Exam     General -- no distress, slightly dry oral mucosa  Lungs -- clear  Heart -- S1, S2 heard, JVD - no  Abdomen - soft, non-tender  Extremities --no edema noted  CNS - awake and alert    Labs, Radiology and Tests    Labs -    4/21 10/21 4/22 6/22 9/22       Potassium 3.6 4.1 5.0 3.8 4.0       BUN  23 37 23 40       Creatinine  1.2 2.1 1.60 2.4       eGFR  44 23 31 20                   UPCR     1236       UMCR     405                     Renal Ultrasound Scan -- pending       Assessment    Renal -she has chronic kidney disease stage III with baseline creatinine of 1.6 recently in [facility]  -Creatinine is getting slightly worse in the last couple of months patient clinically looks dry to some degree  -creatinine is worse clinically looks dry  -Not exactly sure how much she is drinking I emphasized the importance of drinking at least 60 ounces of liquids per day.   -Patient had some proteinuria hypercalcemia renal dysfunction and also some anemia  -I will send all the serologic work-up at this time and I will also repeat a renal ultrasound scan if negative we will probably proceed with a renal biopsy

## 2022-09-29 DIAGNOSIS — N17.9 AKI (ACUTE KIDNEY INJURY) (HCC): ICD-10-CM

## 2022-09-30 LAB
BUN BLDV-MCNC: 37 MG/DL
CALCIUM SERPL-MCNC: 10.3 MG/DL
CHLORIDE BLD-SCNC: 107 MMOL/L
CO2: 22 MMOL/L
CREAT SERPL-MCNC: 2.4 MG/DL
GFR CALCULATED: 24
GLUCOSE BLD-MCNC: 144 MG/DL
POTASSIUM SERPL-SCNC: 4.2 MMOL/L
SODIUM BLD-SCNC: 135 MMOL/L

## 2022-10-03 LAB
ALBUMIN SERPL-MCNC: 3.5 G/DL
ALP BLD-CCNC: 126 U/L
ALT SERPL-CCNC: 17 U/L
ANION GAP SERPL CALCULATED.3IONS-SCNC: NORMAL MMOL/L
AST SERPL-CCNC: 16 U/L
BASOPHILS ABSOLUTE: ABNORMAL
BASOPHILS RELATIVE PERCENT: ABNORMAL
BILIRUB SERPL-MCNC: 0.3 MG/DL (ref 0.1–1.4)
BUN BLDV-MCNC: 39 MG/DL
CALCIUM SERPL-MCNC: 10.6 MG/DL
CHLORIDE BLD-SCNC: 108 MMOL/L
CHOLESTEROL, TOTAL: 208 MG/DL
CHOLESTEROL/HDL RATIO: ABNORMAL
CO2: 22 MMOL/L
CREAT SERPL-MCNC: 2.6 MG/DL
EOSINOPHILS ABSOLUTE: ABNORMAL
EOSINOPHILS RELATIVE PERCENT: ABNORMAL
GFR CALCULATED: 18
GLUCOSE BLD-MCNC: 89 MG/DL
HCT VFR BLD CALC: 29.8 % (ref 36–46)
HDLC SERPL-MCNC: 136 MG/DL (ref 35–70)
HEMOGLOBIN: 9.5 G/DL (ref 12–16)
LDL CHOLESTEROL CALCULATED: 113 MG/DL (ref 0–160)
LYMPHOCYTES ABSOLUTE: ABNORMAL
LYMPHOCYTES RELATIVE PERCENT: ABNORMAL
MCH RBC QN AUTO: ABNORMAL PG
MCHC RBC AUTO-ENTMCNC: ABNORMAL G/DL
MCV RBC AUTO: ABNORMAL FL
MONOCYTES ABSOLUTE: ABNORMAL
MONOCYTES RELATIVE PERCENT: ABNORMAL
NEUTROPHILS ABSOLUTE: ABNORMAL
NEUTROPHILS RELATIVE PERCENT: ABNORMAL
NONHDLC SERPL-MCNC: ABNORMAL MG/DL
PLATELET # BLD: 257 K/ΜL
PMV BLD AUTO: ABNORMAL FL
POTASSIUM SERPL-SCNC: 5.1 MMOL/L
RBC # BLD: 3.55 10^6/ΜL
SODIUM BLD-SCNC: 137 MMOL/L
TOTAL PROTEIN: 6.2
TRIGL SERPL-MCNC: 136 MG/DL
VITAMIN D 25-HYDROXY: 50
VITAMIN D2, 25 HYDROXY: NORMAL
VITAMIN D3,25 HYDROXY: NORMAL
VLDLC SERPL CALC-MCNC: 27 MG/DL
WBC # BLD: 6 10^3/ML

## 2022-10-21 LAB
ALBUMIN SERPL-MCNC: 3.3 G/DL
ALP BLD-CCNC: 111 U/L
ALT SERPL-CCNC: 17 U/L
ANION GAP SERPL CALCULATED.3IONS-SCNC: NORMAL MMOL/L
ANTIBODY: NORMAL
AST SERPL-CCNC: 14 U/L
BASOPHILS ABSOLUTE: ABNORMAL
BASOPHILS RELATIVE PERCENT: ABNORMAL
BILIRUB SERPL-MCNC: 0.3 MG/DL (ref 0.1–1.4)
BUN BLDV-MCNC: 37 MG/DL
CALCIUM SERPL-MCNC: 10.4 MG/DL
CHLORIDE BLD-SCNC: 109 MMOL/L
CO2: 21 MMOL/L
CREAT SERPL-MCNC: 2.6 MG/DL
EOSINOPHILS ABSOLUTE: ABNORMAL
EOSINOPHILS RELATIVE PERCENT: ABNORMAL
GFR CALCULATED: 16
GLUCOSE BLD-MCNC: 93 MG/DL
HCT VFR BLD CALC: 27 % (ref 36–46)
HEMOGLOBIN: 8.5 G/DL (ref 12–16)
LYMPHOCYTES ABSOLUTE: ABNORMAL
LYMPHOCYTES RELATIVE PERCENT: ABNORMAL
MCH RBC QN AUTO: ABNORMAL PG
MCHC RBC AUTO-ENTMCNC: ABNORMAL G/DL
MCV RBC AUTO: ABNORMAL FL
MONOCYTES ABSOLUTE: ABNORMAL
MONOCYTES RELATIVE PERCENT: ABNORMAL
NEUTROPHILS ABSOLUTE: ABNORMAL
NEUTROPHILS RELATIVE PERCENT: ABNORMAL
PLATELET # BLD: 222 K/ΜL
PMV BLD AUTO: ABNORMAL FL
POTASSIUM SERPL-SCNC: 4.3 MMOL/L
RBC # BLD: 3.32 10^6/ΜL
SODIUM BLD-SCNC: 136 MMOL/L
TOTAL PROTEIN: 5.7
WBC # BLD: 4.4 10^3/ML

## 2022-10-28 ENCOUNTER — OFFICE VISIT (OUTPATIENT)
Dept: NEPHROLOGY | Age: 77
End: 2022-10-28
Payer: MEDICARE

## 2022-10-28 VITALS
SYSTOLIC BLOOD PRESSURE: 108 MMHG | OXYGEN SATURATION: 100 % | BODY MASS INDEX: 15.91 KG/M2 | HEART RATE: 71 BPM | DIASTOLIC BLOOD PRESSURE: 68 MMHG | WEIGHT: 87 LBS

## 2022-10-28 DIAGNOSIS — E83.52 HYPERCALCEMIA: ICD-10-CM

## 2022-10-28 DIAGNOSIS — R80.0 ISOLATED NON-NEPHROTIC PROTEINURIA: ICD-10-CM

## 2022-10-28 DIAGNOSIS — I10 HTN (HYPERTENSION), BENIGN: ICD-10-CM

## 2022-10-28 DIAGNOSIS — N17.9 AKI (ACUTE KIDNEY INJURY) (HCC): Primary | ICD-10-CM

## 2022-10-28 PROCEDURE — 99215 OFFICE O/P EST HI 40 MIN: CPT | Performed by: INTERNAL MEDICINE

## 2022-10-28 PROCEDURE — G8400 PT W/DXA NO RESULTS DOC: HCPCS | Performed by: INTERNAL MEDICINE

## 2022-10-28 PROCEDURE — G8427 DOCREV CUR MEDS BY ELIG CLIN: HCPCS | Performed by: INTERNAL MEDICINE

## 2022-10-28 PROCEDURE — G8484 FLU IMMUNIZE NO ADMIN: HCPCS | Performed by: INTERNAL MEDICINE

## 2022-10-28 PROCEDURE — G8419 CALC BMI OUT NRM PARAM NOF/U: HCPCS | Performed by: INTERNAL MEDICINE

## 2022-10-28 PROCEDURE — 3017F COLORECTAL CA SCREEN DOC REV: CPT | Performed by: INTERNAL MEDICINE

## 2022-10-28 PROCEDURE — 3078F DIAST BP <80 MM HG: CPT | Performed by: INTERNAL MEDICINE

## 2022-10-28 PROCEDURE — 4004F PT TOBACCO SCREEN RCVD TLK: CPT | Performed by: INTERNAL MEDICINE

## 2022-10-28 PROCEDURE — 1090F PRES/ABSN URINE INCON ASSESS: CPT | Performed by: INTERNAL MEDICINE

## 2022-10-28 PROCEDURE — 1123F ACP DISCUSS/DSCN MKR DOCD: CPT | Performed by: INTERNAL MEDICINE

## 2022-10-28 PROCEDURE — 3074F SYST BP LT 130 MM HG: CPT | Performed by: INTERNAL MEDICINE

## 2022-10-28 NOTE — PROGRESS NOTES
[facility]        Outpatient Follow-Up note         10/28/2022 10:13 AM    Patient Name:   Sandra Saini:    1945  Primary Care Physician:  [facility] [place] [address]  Dept: [phone]  Loc: [phone]     Chief Complaint / Reason for follow-up : Follow Up of CKD     Interval History :  Patient seen and examined by me. Feels well denies any complaints   no chest pain or significant shortness of breath  No hospitalizations and no new meds  Not taking any pain medications at this time. Past History :  Past Medical History:   Diagnosis Date    Anemia     Heart abnormality     Hypertension      Past Surgical History:   Procedure Laterality Date    HEART CHAMBER REVISION      WRIST SURGERY          Medications :     Outpatient Medications Marked as Taking for the 10/28/22 encounter (Office Visit) with David Adams MD   Medication Sig Dispense Refill    pantoprazole (PROTONIX) 40 MG tablet Take by mouth every morning (before breakfast)      sucralfate (CARAFATE) 1 GM tablet Take 1 g by mouth in the morning and 1 g at noon and 1 g in the evening and 1 g before bedtime. ondansetron (ZOFRAN) 4 MG tablet Take 4 mg by mouth every 8 hours as needed for Nausea or Vomiting      vitamin D (CHOLECALCIFEROL) 125 MCG (5000 UT) CAPS capsule Take 5,000 Units by mouth in the morning.       OXYGEN Inhale into the lungs      metoprolol tartrate (LOPRESSOR) 25 MG tablet Take 0.5 tablets by mouth 2 times daily (Patient taking differently: Take 12.5 mg by mouth daily Hold if SBP <100, DBP<60 or pulse >60) 60 tablet 1    albuterol sulfate HFA (PROVENTIL;VENTOLIN;PROAIR) 108 (90 Base) MCG/ACT inhaler Inhale 2 puffs into the lungs every 4 hours as needed for Wheezing      Calcium Carb-Cholecalciferol 600-400 MG-UNIT CAPS Take by mouth daily      Levothyroxine Sodium 100 MCG CAPS Take 100 mcg by mouth Daily      polyethylene glycol (GLYCOLAX) 17 GM/SCOOP powder Take 17 g by mouth daily as needed      acetaminophen (TYLENOL) 325 MG tablet Take 325 mg by mouth every 4 hours      amiodarone (CORDARONE) 200 MG tablet Take 200 mg by mouth in the morning. DULoxetine (CYMBALTA) 30 MG extended release capsule Take 30 mg by mouth daily      DULoxetine (CYMBALTA) 60 MG extended release capsule Take 60 mg by mouth daily Takes w 30 mg         Vitals     /68 (Site: Right Upper Arm, Position: Sitting, Cuff Size: Small Adult)   Pulse 71   Wt 87 lb (39.5 kg)   SpO2 100%   BMI 15.91 kg/m²  Wt Readings from Last 3 Encounters:   10/28/22 87 lb (39.5 kg)   09/23/22 85 lb 11.2 oz (38.9 kg)   08/10/22 88 lb (39.9 kg)        Physical Exam     General -- no distress, slightly dry oral mucosa  Lungs -- clear  Heart -- S1, S2 heard, JVD - no  Abdomen - soft, non-tender  Extremities --no edema noted  CNS - awake and alert    Labs, Radiology and Tests    Labs -    4/21 10/21 4/22 6/22 9/22 10/22      Potassium 3.6 4.1 5.0 3.8 4.0 5.1      BUN  23 37 23 40 39      Creatinine  1.2 2.1 1.60 2.4 2.6      eGFR  44 23 31 20 18                  UPCR     1236       UMCR     405                     Renal Ultrasound Scan -- 9/22  8.5 cm right kidney 8.5 cm left kidney     Assessment    Renal -she has chronic kidney disease stage III with baseline creatinine of 1.6 recently in acute kidney injury at [facility]  -Creatinine is getting gradually worse in the last couple of months patient clinically looks dry to some degree  -Says she is drinking a lot of liquids at this time  -Patient had some proteinuria hypercalcemia renal dysfunction and also some anemia  -Renal ultrasound showed possibly mild bilateral hydro we will check with just and also the serologic work-up shows positive for TAN and possible mild M band.   We will get a renal biopsy  Electrolytes-within normal limits  Essential hypertension running reasonable  History of COPD  History of atrial fibrillation on Eliquis and diltiazem  Proteinuria etiology serologic work-up not confirmatory at this time. hypercalcemia  meds reviewed and D/W patient  Follow-up in 1 months    Tests and orders placed this Encounter   No orders of the defined types were placed in this encounter. Yarelis Estrada M.D  [facility].

## 2022-11-03 LAB
BASOPHILS ABSOLUTE: ABNORMAL
BASOPHILS RELATIVE PERCENT: ABNORMAL
BUN BLDV-MCNC: 34 MG/DL
CALCIUM SERPL-MCNC: 11 MG/DL
CHLORIDE BLD-SCNC: 107 MMOL/L
CO2: 24 MMOL/L
CREAT SERPL-MCNC: 2.4 MG/DL
EOSINOPHILS ABSOLUTE: ABNORMAL
EOSINOPHILS RELATIVE PERCENT: ABNORMAL
GFR CALCULATED: 20
GLUCOSE BLD-MCNC: 93 MG/DL
HCT VFR BLD CALC: 28.4 % (ref 36–46)
HEMOGLOBIN: 9 G/DL (ref 12–16)
LYMPHOCYTES ABSOLUTE: ABNORMAL
LYMPHOCYTES RELATIVE PERCENT: ABNORMAL
MCH RBC QN AUTO: ABNORMAL PG
MCHC RBC AUTO-ENTMCNC: ABNORMAL G/DL
MCV RBC AUTO: ABNORMAL FL
MONOCYTES ABSOLUTE: ABNORMAL
MONOCYTES RELATIVE PERCENT: ABNORMAL
NEUTROPHILS ABSOLUTE: ABNORMAL
NEUTROPHILS RELATIVE PERCENT: ABNORMAL
PLATELET # BLD: 221 K/ΜL
PMV BLD AUTO: ABNORMAL FL
POTASSIUM SERPL-SCNC: 4 MMOL/L
RBC # BLD: 3.57 10^6/ΜL
SODIUM BLD-SCNC: 137 MMOL/L
WBC # BLD: 5.7 10^3/ML

## 2022-11-17 ENCOUNTER — HOSPITAL ENCOUNTER (OUTPATIENT)
Dept: CT IMAGING | Age: 77
Discharge: HOME | End: 2022-11-17
Payer: MEDICARE

## 2022-11-17 VITALS
SYSTOLIC BLOOD PRESSURE: 122 MMHG | DIASTOLIC BLOOD PRESSURE: 61 MMHG | RESPIRATION RATE: 20 BRPM | TEMPERATURE: 98.4 F | OXYGEN SATURATION: 95 % | HEART RATE: 94 BPM

## 2022-11-17 DIAGNOSIS — N17.9 AKI (ACUTE KIDNEY INJURY) (HCC): ICD-10-CM

## 2022-11-17 LAB
CREAT SERPL-MCNC: 2.3 MG/DL (ref 0.4–1.2)
ERYTHROCYTE [DISTWIDTH] IN BLOOD BY AUTOMATED COUNT: 15.3 % (ref 11.5–14.5)
ERYTHROCYTE [DISTWIDTH] IN BLOOD BY AUTOMATED COUNT: 44.9 FL (ref 35–45)
GFR SERPL CREATININE-BSD FRML MDRD: 22 ML/MIN/1.73M2
HCT VFR BLD CALC: 29.9 % (ref 37–47)
HEMOGLOBIN: 9.1 GM/DL (ref 12–16)
MCH RBC QN AUTO: 24.5 PG (ref 26–33)
MCHC RBC AUTO-ENTMCNC: 30.4 GM/DL (ref 32.2–35.5)
MCV RBC AUTO: 80.6 FL (ref 81–99)
PLATELET # BLD: 324 THOU/MM3 (ref 130–400)
PMV BLD AUTO: 9.8 FL (ref 9.4–12.4)
RBC # BLD: 3.71 MILL/MM3 (ref 4.2–5.4)
WBC # BLD: 11.3 THOU/MM3 (ref 4.8–10.8)

## 2022-11-17 PROCEDURE — 6370000000 HC RX 637 (ALT 250 FOR IP): Performed by: RADIOLOGY

## 2022-11-17 PROCEDURE — 85027 COMPLETE CBC AUTOMATED: CPT

## 2022-11-17 PROCEDURE — 50200 RENAL BIOPSY PERQ: CPT

## 2022-11-17 PROCEDURE — 2580000003 HC RX 258: Performed by: RADIOLOGY

## 2022-11-17 PROCEDURE — 88348 ELECTRON MICROSCOPY DX: CPT

## 2022-11-17 PROCEDURE — 88350 IMFLUOR EA ADDL 1ANTB STN PX: CPT

## 2022-11-17 PROCEDURE — 77012 CT SCAN FOR NEEDLE BIOPSY: CPT

## 2022-11-17 PROCEDURE — 88313 SPECIAL STAINS GROUP 2: CPT

## 2022-11-17 PROCEDURE — 82565 ASSAY OF CREATININE: CPT

## 2022-11-17 PROCEDURE — 88305 TISSUE EXAM BY PATHOLOGIST: CPT

## 2022-11-17 PROCEDURE — 88346 IMFLUOR 1ST 1ANTB STAIN PX: CPT

## 2022-11-17 PROCEDURE — 36415 COLL VENOUS BLD VENIPUNCTURE: CPT

## 2022-11-17 PROCEDURE — 6360000002 HC RX W HCPCS: Performed by: RADIOLOGY

## 2022-11-17 RX ORDER — IBUPROFEN 200 MG
TABLET ORAL
Status: COMPLETED | OUTPATIENT
Start: 2022-11-17 | End: 2022-11-17

## 2022-11-17 RX ORDER — CALCIUM CARBONATE/VITAMIN D3 600MG-62.5
CAPSULE ORAL
COMMUNITY

## 2022-11-17 RX ORDER — SODIUM CHLORIDE 450 MG/100ML
INJECTION, SOLUTION INTRAVENOUS CONTINUOUS
Status: DISCONTINUED | OUTPATIENT
Start: 2022-11-17 | End: 2022-11-18 | Stop reason: HOSPADM

## 2022-11-17 RX ORDER — MIDAZOLAM HYDROCHLORIDE 1 MG/ML
INJECTION INTRAMUSCULAR; INTRAVENOUS
Status: COMPLETED | OUTPATIENT
Start: 2022-11-17 | End: 2022-11-17

## 2022-11-17 RX ADMIN — SODIUM CHLORIDE: 4.5 INJECTION, SOLUTION INTRAVENOUS at 09:13

## 2022-11-17 RX ADMIN — HYDROMORPHONE HYDROCHLORIDE 0.5 MG: 1 INJECTION, SOLUTION INTRAMUSCULAR; INTRAVENOUS; SUBCUTANEOUS at 10:59

## 2022-11-17 RX ADMIN — BACITRACIN, NEOMYCIN, POLYMYXIN B 0.9 G: 400; 3.5; 5 OINTMENT TOPICAL at 11:47

## 2022-11-17 RX ADMIN — MIDAZOLAM 0.5 MG: 1 INJECTION INTRAMUSCULAR; INTRAVENOUS at 10:59

## 2022-11-17 ASSESSMENT — PAIN SCALES - GENERAL
PAINLEVEL_OUTOF10: 0
PAINLEVEL_OUTOF10: 0

## 2022-11-17 ASSESSMENT — PAIN - FUNCTIONAL ASSESSMENT: PAIN_FUNCTIONAL_ASSESSMENT: 0-10

## 2022-11-17 NOTE — H&P
Formulation and discussion of sedation / procedure plans, risks, benefits, side effects and alternatives with patient and/or responsible adult completed. History and Physical reviewed and unchanged.     Electronically signed by Hernando Lee MD on 11/17/22 at 10:39 AM EST

## 2022-11-17 NOTE — PROGRESS NOTES
0825 pt admitted to [facility] per wheelchair from [facility] for a random renal biopsy Assist to bed. Pt on oxygen at 2l nc. Pt with cough from smoking she states. Alert. Pt denies any blood thinners or asa . Medications verified with [facility] med sheet. Pt states nothing to eat since yesterday. PT RIGHTS AND RESPONSIBILITIES OFFERED TO PT.   0900 report called to aimee reynolds. Dr Pretty Kang called and orders received. [address] sent. No other concerns noted. 6242 discharge instructions reviewed with patient. Verbalize understanding of home going instructions. 1000 resting quietly at present. 1015 pt taken per cart to ct scan. 1208 pt return to [facility]. Alert . oxygen on . Resp even and easy . Bandaid intact to right flank area with no bleeding, swelling noted to area. Pt denies pain. No issues noted. 1230 pt with no complaints. Resting and denies pain at present. 1300 pt sleeping quickly at present. 1400 pt denies pain at present. 1405 pt taking pepsi in . Denies nausea, vomiting. Loulou well.   1500 resting quietly at present. Denies pain. 1515 assist up to bathroom. Pt had diaper and changed for large amount of urine. Pt also void clear yellow urine . No blood noted in urine. 1530 clothes changed with assist. Pt denies pain. No bleeding, swelling noted to flank area. 1540 pt in wheelchair with no concerns noted. [phone] pt discharged per wheelchair to return to the [facility]. Shahab Phelps from transport here to take patient.    1600 report called to donnell at the [facility]                     ___m_ Safety:       (Environmental)  [place] to environment  Ensure ID band is correct and in place/ allergy band as needed  Assess for fall risk  Initiate fall precautions as applicable (fall band, side rails, etc.)  Call light within reach  Bed in low position/ wheels locked    __m__ Pain:       Assess pain level and characteristics  Administer analgesics as ordered  Assess effectiveness of pain management and report to MD as needed    ___m_ Knowledge Deficit:  Assess baseline knowledge  Provide teaching at level of understanding  Provide teaching via preferred learning method  Evaluate teaching effectiveness    __m__ Hemodynamic/Respiratory Status:       (Pre and Post Procedure Monitoring)  Assess/Monitor vital signs and LOC  Assess Baseline SpO2 prior to any sedation  Obtain weight/height  Assess vital signs/ LOC until patient meets discharge criteria  Monitor procedure site and notify MD of any issues    _

## 2022-11-17 NOTE — DISCHARGE INSTRUCTIONS
SEDATION/ANALGESIA INFORMATION HOME GOING ADVICE    Review the following information with the patient prior to the procedure. Sedation/agalgesia is used during short medical procedures under controlled supervision. The medication will produce a strong relaxation. You will be able to hear, speak and follow instructions, but you memory and alertness will be decreased. You will be able to swallow and breathe on your own. During sedation/analgesia you blood pressure, hear and breathing will be watched closely. After the procedure, you may not remember what was said or done. Procedure:random renal       Date:  11/ 17   You may have the following effects from the medication. Drowsiness, dizziness, sleepiness or confusion. Difficulty remembering or delayed reaction times. Loss of fine muscle control or difficulty with your balance especially while walking. Difficulty focusing or blurred vision. You may not be aware of slight changes in your behavior and/or your reaction time because of the medication used during the procedure. Therefore you should follow these instructions. Have someone responsible help you with your care. Do not drive for 24 hours. Do not operate equipment for 24 hours (lawnmowers, power tools, kitchen accessories, stove). Do not drink any alcoholic beverages for a minimum of 24 hours. Do not make important personal, legal or business decisions for 24 hours. You may experience dizziness or lightheadedness. Move slowly and carefully, do not make sudden position changes. Drink extra amounts of fluids today. Increase your diet as tolerated (unless you have received specific instructions from your doctor). If you feel nauseated, continue with liquids until nausea is gone  Notify your physician if you have not urinated within 8 hours after the procedure. Resume your medications unless otherwise instructed. contact your physician if you have any questions or concerns.   I have been informed and understand how to care for myself/the patient at home. i know who to call if Declan Pop question or concerns. The adult responsible for my discharge care is: jeff tesfaye  transport      You have received the sedation/analgesia medications/s:  Versed, fentanyl     IF YOU REPORT TO AN EMERGENCY ROOM, DOCTOR'S OFFICE OR HOSPITAL WITHIN 24 HRS AFTER PROCEDURE, BRING THIS SHEET WITH YOU AND GIVE IT THE PHYSICIAN OR NURSE ATTENDING YOU. POST BIOPSY DISCHARGE INSTRUCTION SHEET    DIET:  As tolerated    ACTIVITY:  Rest at home on sofa, bed or recliner today. Bathroom privileges only today. Limit any exertion (pushing or pulling) today. No lifting for 3 days. No driving today. Check biopsy site frequently today. Resume any blood thinners in 24 hours. Keep band aid clean & dry - replace as needed, may remove in 48 hours. RETURN TO NEAREST EMERGENCY ROOM IF YOU HAVE ANY OF THE FOLLOWING:  Sign of bleeding, swelling, drainage from biopsy site or severe pain (slight discomfort to be expected) around biopsy site. Repeated nausea/vomiting/abdominal pain. Elevated temperature above 101 degrees. Shortness of breath. Chest pain. Keep scheduled appointment with your physician. If sedation given, follow post sedation instruction sheet.       _

## 2022-11-17 NOTE — H&P
[address] [address]  Sedation/Analgesia History & Physical    Pt Name: Miriam Rodriguez  MRN: [id number]  YOB: 1945  Provider Performing Procedure: Dawn Lara MD, MD  Primary Care Physician: Cornelius Honeycutt    Formulation and discussion of sedation / procedure plans, risks, benefits, side effects and alternatives with patient and/or responsible adult completed. PRE-PROCEDURE   DNR-CCA/DNR-CC []Yes [x]No  Brief History/Pre-Procedure Diagnosis: HERIBERTO          MEDICAL HISTORY  []CAD/Valve  []Liver Disease  []Lung Disease []Diabetes  []Hypertension []Renal Disease  []Additional information:       has a past medical history of Anemia, Heart abnormality, and Hypertension. SURGICAL HISTORY   has a past surgical history that includes heart chamber revision and Wrist surgery. Additional information:       ALLERGIES   Allergies as of 11/17/2022 - Fully Reviewed 11/17/2022   Allergen Reaction Noted    Amoxicillin  09/19/2014     Additional information:       MEDICATIONS   Coumadin Use Last 5 Days [x]No []Yes  Antiplatelet drug therapy use last 5 days  [x]No []Yes  Other anticoagulant use last 5 days  [x]No []Yes    Current Outpatient Medications:     Acetylcysteine, Nutrient, (N-ACETYL CYSTEINE) 600 MG TABS, Take by mouth, Disp: , Rfl:     pantoprazole (PROTONIX) 40 MG tablet, Take by mouth every morning (before breakfast), Disp: , Rfl:     sucralfate (CARAFATE) 1 GM tablet, Take 1 g by mouth in the morning and 1 g at noon and 1 g in the evening and 1 g before bedtime. , Disp: , Rfl:     ondansetron (ZOFRAN) 4 MG tablet, Take 4 mg by mouth every 8 hours as needed for Nausea or Vomiting, Disp: , Rfl:     vitamin D (CHOLECALCIFEROL) 125 MCG (5000 UT) CAPS capsule, Take 5,000 Units by mouth in the morning., Disp: , Rfl:     OXYGEN, Inhale into the lungs, Disp: , Rfl:     metoprolol tartrate (LOPRESSOR) 25 MG tablet, Take 0.5 tablets by mouth 2 times daily (Patient taking differently: Take 12.5 mg by mouth daily Hold if SBP <100, DBP<60 or pulse >60), Disp: 60 tablet, Rfl: 1    albuterol sulfate HFA (PROVENTIL;VENTOLIN;PROAIR) 108 (90 Base) MCG/ACT inhaler, Inhale 2 puffs into the lungs every 4 hours as needed for Wheezing, Disp: , Rfl:     Calcium Carb-Cholecalciferol 600-400 MG-UNIT CAPS, Take by mouth daily, Disp: , Rfl:     Levothyroxine Sodium 100 MCG CAPS, Take 100 mcg by mouth Daily, Disp: , Rfl:     polyethylene glycol (GLYCOLAX) 17 GM/SCOOP powder, Take 17 g by mouth daily as needed, Disp: , Rfl:     acetaminophen (TYLENOL) 325 MG tablet, Take 325 mg by mouth every 4 hours, Disp: , Rfl:     amiodarone (CORDARONE) 200 MG tablet, Take 200 mg by mouth in the morning., Disp: , Rfl:     DULoxetine (CYMBALTA) 30 MG extended release capsule, Take 30 mg by mouth daily, Disp: , Rfl:     DULoxetine (CYMBALTA) 60 MG extended release capsule, Take 60 mg by mouth daily Takes w 30 mg, Disp: , Rfl:     Current Facility-Administered Medications:     0.45 % sodium chloride infusion, , IntraVENous, Continuous, Brent Chambers MD, Last Rate: 20 mL/hr at 11/17/22 0913, New Bag at 11/17/22 0913  Prior to Admission medications    Medication Sig Start Date End Date Taking? Authorizing Provider   Acetylcysteine, Nutrient, (N-ACETYL CYSTEINE) 600 MG TABS Take by mouth   Yes Historical Provider, MD   pantoprazole (PROTONIX) 40 MG tablet Take by mouth every morning (before breakfast)    Historical Provider, MD   sucralfate (CARAFATE) 1 GM tablet Take 1 g by mouth in the morning and 1 g at noon and 1 g in the evening and 1 g before bedtime. Historical Provider, MD   ondansetron (ZOFRAN) 4 MG tablet Take 4 mg by mouth every 8 hours as needed for Nausea or Vomiting    Historical Provider, MD   vitamin D (CHOLECALCIFEROL) 125 MCG (5000 UT) CAPS capsule Take 5,000 Units by mouth in the morning.     Historical Provider, MD   OXYGEN Inhale into the lungs    Historical Provider, MD   metoprolol tartrate (LOPRESSOR) 25 MG tablet Take 0.5 tablets by mouth 2 times daily  Patient taking differently: Take 12.5 mg by mouth daily Hold if SBP <100, DBP<60 or pulse >60 4/19/22   Jamari Foster MD   albuterol sulfate HFA (PROVENTIL;VENTOLIN;PROAIR) 108 (90 Base) MCG/ACT inhaler Inhale 2 puffs into the lungs every 4 hours as needed for Wheezing    Historical Provider, MD   Calcium Carb-Cholecalciferol 600-400 MG-UNIT CAPS Take by mouth daily    Historical Provider, MD   Levothyroxine Sodium 100 MCG CAPS Take 100 mcg by mouth Daily    Historical Provider, MD   polyethylene glycol (GLYCOLAX) 17 GM/SCOOP powder Take 17 g by mouth daily as needed    Historical Provider, MD   acetaminophen (TYLENOL) 325 MG tablet Take 325 mg by mouth every 4 hours    Historical Provider, MD   amiodarone (CORDARONE) 200 MG tablet Take 200 mg by mouth in the morning.     Historical Provider, MD   DULoxetine (CYMBALTA) 30 MG extended release capsule Take 30 mg by mouth daily 12/28/20   Historical Provider, MD   DULoxetine (CYMBALTA) 60 MG extended release capsule Take 60 mg by mouth daily Takes w 30 mg 12/28/20   Historical Provider, MD     Additional information:       VITAL SIGNS   Vitals:    11/17/22 0841   BP: (!) 153/79   Pulse: 97   Resp: 18   Temp: 98.4 °F (36.9 °C)   SpO2: 90%       PHYSICAL:   Heart:  [x]Regular rate and rhythm  []Other:    Lungs:  [x]Clear    []Other:    Abdomen: [x]Soft    []Other:    Mental Status: [x]Alert & Oriented  []Other:      PLANNED PROCEDURE   [x]Biospy []Arteriogram              []Drainage   []Mediport Insertion  []Fistulogram []IV access       []Vertebroplasty / Augmentation  []IVC filter []Dialysis catheter []Biliary drainage  []Other: []CAPD Catheter []Nephrostomy Tube / Stent  SEDATION  Planned agent:[x]Midazolam []Meperidine []Sublimaze [x]Dilaudid []Morphine     []Diazepam  []Other:     ASA Classification:  []1 [x]2 []3 []4 []5  Class 1: A normal healthy patient  Class 2: Pt with mild to moderate systemic disease  Class 3: Severe systemic disease or disturbance  Class 4: Severe systemic disorders that are already life threatening. Class 5: Moribund pt with little chances of survival, for more than 24 hours. Mallampati I Airway Classification:   []1 [x]2 []3 []4    [x]Pre-procedure diagnostic studies complete and results available. Comment:    [x]Previous sedation/anesthesia experiences assessed. Comment:    [x]The patient is an appropriate candidate to undergo the planned procedure sedation and anesthesia. (Refer to nursing sedation/analgesia documentation record)  [x]Formulation and discussion of sedation/procedure plan, risks, and expectations with patient and/or responsible adult completed. [x]Patient examined immediately prior to the procedure.  (Refer to nursing sedation/analgesia documentation record)    Randy Goodwin MD, MD  Electronically signed 11/17/2022 at 10:39 AM

## 2022-11-22 LAB
ALBUMIN SERPL-MCNC: 3.2 G/DL
ALP BLD-CCNC: 112 U/L
ALT SERPL-CCNC: 19 U/L
ANION GAP SERPL CALCULATED.3IONS-SCNC: NORMAL MMOL/L
AST SERPL-CCNC: 18 U/L
BILIRUB SERPL-MCNC: 0.3 MG/DL (ref 0.1–1.4)
BUN BLDV-MCNC: 34 MG/DL
CALCIUM SERPL-MCNC: 10.6 MG/DL
CHLORIDE BLD-SCNC: 104 MMOL/L
CO2: 20 MMOL/L
CREAT SERPL-MCNC: 2.6 MG/DL
GFR CALCULATED: 18
GLUCOSE BLD-MCNC: 131 MG/DL
POTASSIUM SERPL-SCNC: 3.9 MMOL/L
SODIUM BLD-SCNC: 134 MMOL/L
TOTAL PROTEIN: 5.8

## 2022-11-25 LAB — MISC REFERENCE: NORMAL

## 2022-11-29 ENCOUNTER — OFFICE VISIT (OUTPATIENT)
Dept: NEPHROLOGY | Age: 77
End: 2022-11-29
Payer: MEDICARE

## 2022-11-29 ENCOUNTER — TELEPHONE (OUTPATIENT)
Dept: NEPHROLOGY | Age: 77
End: 2022-11-29

## 2022-11-29 VITALS — SYSTOLIC BLOOD PRESSURE: 96 MMHG | OXYGEN SATURATION: 100 % | DIASTOLIC BLOOD PRESSURE: 60 MMHG | HEART RATE: 82 BPM

## 2022-11-29 DIAGNOSIS — N17.9 AKI (ACUTE KIDNEY INJURY) (HCC): Primary | ICD-10-CM

## 2022-11-29 DIAGNOSIS — I95.89 OTHER HYPOTENSION: ICD-10-CM

## 2022-11-29 DIAGNOSIS — E83.52 HYPERCALCEMIA: ICD-10-CM

## 2022-11-29 PROCEDURE — G8419 CALC BMI OUT NRM PARAM NOF/U: HCPCS | Performed by: INTERNAL MEDICINE

## 2022-11-29 PROCEDURE — 1123F ACP DISCUSS/DSCN MKR DOCD: CPT | Performed by: INTERNAL MEDICINE

## 2022-11-29 PROCEDURE — 1090F PRES/ABSN URINE INCON ASSESS: CPT | Performed by: INTERNAL MEDICINE

## 2022-11-29 PROCEDURE — G8400 PT W/DXA NO RESULTS DOC: HCPCS | Performed by: INTERNAL MEDICINE

## 2022-11-29 PROCEDURE — G8484 FLU IMMUNIZE NO ADMIN: HCPCS | Performed by: INTERNAL MEDICINE

## 2022-11-29 PROCEDURE — 3017F COLORECTAL CA SCREEN DOC REV: CPT | Performed by: INTERNAL MEDICINE

## 2022-11-29 PROCEDURE — G8427 DOCREV CUR MEDS BY ELIG CLIN: HCPCS | Performed by: INTERNAL MEDICINE

## 2022-11-29 PROCEDURE — 99215 OFFICE O/P EST HI 40 MIN: CPT | Performed by: INTERNAL MEDICINE

## 2022-11-29 PROCEDURE — 4004F PT TOBACCO SCREEN RCVD TLK: CPT | Performed by: INTERNAL MEDICINE

## 2022-11-29 RX ORDER — MIDODRINE HYDROCHLORIDE 5 MG/1
5 TABLET ORAL 3 TIMES DAILY
Qty: 90 TABLET | Refills: 3 | Status: SHIPPED | OUTPATIENT
Start: 2022-11-29

## 2022-11-29 NOTE — TELEPHONE ENCOUNTER
Yoko from [facility] called. She said that Dr. Becky Shi sent a prescription to [address] [place] [address] gets her medication from the pharmacy at [facility].

## 2022-11-29 NOTE — TELEPHONE ENCOUNTER
I spoke [facility] Maribel G[facility] and requested the Midodrine order be cancelled so it could be filled by [facility].

## 2022-11-29 NOTE — PROGRESS NOTES
[facility]        Outpatient Follow-Up note         11/29/2022 9:07 AM    Patient Name:   Tram George  YOB: 1945  Primary Care Physician:  No primary care provider on file. [address] [place] [id number]  Dept: [phone]  Loc: [phone]     Chief Complaint / Reason for follow-up : Follow Up of CKD     Interval History :  Patient seen and examined by me. Feels well denies any complaints   no chest pain or significant shortness of breath  No hospitalizations and no new meds     Past History :  Past Medical History:   Diagnosis Date    Anemia     Heart abnormality     Hypertension      Past Surgical History:   Procedure Laterality Date    CT BIOPSY RENAL  11/17/2022    CT BIOPSY RENAL 11/17/2022 [facility] CT SCAN    HEART CHAMBER REVISION      WRIST SURGERY          Medications :     Outpatient Medications Marked as Taking for the 11/29/22 encounter (Office Visit) with Yarelis Estrada MD   Medication Sig Dispense Refill    Acetylcysteine, Nutrient, (N-ACETYL CYSTEINE) 600 MG TABS Take by mouth      pantoprazole (PROTONIX) 40 MG tablet Take by mouth every morning (before breakfast)      sucralfate (CARAFATE) 1 GM tablet Take 1 g by mouth in the morning and 1 g at noon and 1 g in the evening and 1 g before bedtime. ondansetron (ZOFRAN) 4 MG tablet Take 4 mg by mouth every 8 hours as needed for Nausea or Vomiting      vitamin D (CHOLECALCIFEROL) 125 MCG (5000 UT) CAPS capsule Take 5,000 Units by mouth in the morning.       OXYGEN Inhale into the lungs      metoprolol tartrate (LOPRESSOR) 25 MG tablet Take 0.5 tablets by mouth 2 times daily (Patient taking differently: Take 12.5 mg by mouth daily Hold if SBP <100, DBP<60 or pulse >60) 60 tablet 1    albuterol sulfate HFA (PROVENTIL;VENTOLIN;PROAIR) 108 (90 Base) MCG/ACT inhaler Inhale 2 puffs into the lungs every 4 hours as needed for Wheezing      Calcium Carb-Cholecalciferol 600-400 MG-UNIT CAPS Take by mouth daily      Levothyroxine Sodium 100 MCG CAPS Take 100 mcg by mouth Daily      polyethylene glycol (GLYCOLAX) 17 GM/SCOOP powder Take 17 g by mouth daily as needed      acetaminophen (TYLENOL) 325 MG tablet Take 325 mg by mouth every 4 hours      amiodarone (CORDARONE) 200 MG tablet Take 200 mg by mouth in the morning. DULoxetine (CYMBALTA) 30 MG extended release capsule Take 30 mg by mouth daily      DULoxetine (CYMBALTA) 60 MG extended release capsule Take 60 mg by mouth daily Takes w 30 mg         Vitals     BP 96/60 (Site: Left Upper Arm, Position: Sitting, Cuff Size: Small Adult)   Pulse 82   SpO2 100%  Wt Readings from Last 3 Encounters:   10/28/22 87 lb (39.5 kg)   09/23/22 85 lb 11.2 oz (38.9 kg)   08/10/22 88 lb (39.9 kg)        Physical Exam     General -- no distress, slightly dry oral mucosa  Lungs -- clear  Heart -- S1, S2 heard, JVD - no  Abdomen - soft, non-tender  Extremities --no edema noted  CNS - awake and alert    Labs, Radiology and Tests    Labs -    4/21 10/21 4/22 6/22 9/22 10/22 11/22     Potassium 3.6 4.1 5.0 3.8 4.0 5.1 3.9     BUN  23 37 23 40 39 34     Creatinine  1.2 2.1 1.60 2.4 2.6 2.6     eGFR  44 23 31 20 18 18                 UPCR     1236       UMCR     405                     Renal Ultrasound Scan -- 9/22  8.5 cm right kidney 8.5 cm left kidney     Assessment    Renal -she has chronic kidney disease stage III with baseline creatinine of 1.6 recently in acute kidney injury at [facility]  -Creatinine is getting gradually worse in the last couple of months patient clinically looks dry to some degree  -Says she is drinking a lot of liquids at this time  -Patient had some proteinuria hypercalcemia renal dysfunction and also some anemia  -Renal ultrasound showed possibly mild bilateral hydro.   We will send her to urology and also she has some M spike will refer to hematology as well  -Renal biopsy does not show show any significant abnormalities only mild tubulointerstitial scarring  -If renal function continues to worsen she will need a renal replacement therapy and patient is agreeable for back  Electrolytes-within normal limits  Essential hypertension running low add midodrine 5mg 3 times daily  History of COPD  History of atrial fibrillation on Eliquis and diltiazem  Proteinuria etiology serologic work-up not confirmatory at this time. hypercalcemia stop the calcium and vitamin D supplements  meds reviewed and D/W patient  Follow-up in 3 months    Tests and orders placed this Encounter   No orders of the defined types were placed in this encounter. Lissy Walls M.D  [facility].

## 2022-12-05 ENCOUNTER — HOSPITAL ENCOUNTER (OUTPATIENT)
Dept: INFUSION THERAPY | Age: 77
Discharge: HOME | End: 2022-12-05
Payer: MEDICARE

## 2022-12-05 ENCOUNTER — OFFICE VISIT (OUTPATIENT)
Dept: ONCOLOGY | Age: 77
End: 2022-12-05
Payer: MEDICARE

## 2022-12-05 VITALS
WEIGHT: 88 LBS | BODY MASS INDEX: 16.1 KG/M2 | RESPIRATION RATE: 20 BRPM | OXYGEN SATURATION: 92 % | DIASTOLIC BLOOD PRESSURE: 69 MMHG | HEART RATE: 78 BPM | SYSTOLIC BLOOD PRESSURE: 140 MMHG | TEMPERATURE: 98.8 F

## 2022-12-05 VITALS
BODY MASS INDEX: 16.1 KG/M2 | OXYGEN SATURATION: 92 % | SYSTOLIC BLOOD PRESSURE: 140 MMHG | TEMPERATURE: 98.8 F | DIASTOLIC BLOOD PRESSURE: 69 MMHG | WEIGHT: 88 LBS | HEART RATE: 78 BPM | RESPIRATION RATE: 20 BRPM

## 2022-12-05 DIAGNOSIS — E83.52 HYPERCALCEMIA: ICD-10-CM

## 2022-12-05 DIAGNOSIS — E83.52 HYPERCALCEMIA: Primary | ICD-10-CM

## 2022-12-05 LAB
ABSOLUTE IMMATURE GRANULOCYTE: 0.01 THOU/MM3 (ref 0–0.07)
ALBUMIN SERPL-MCNC: 3.5 G/DL (ref 3.5–5.1)
ALP BLD-CCNC: 111 U/L (ref 38–126)
ALT SERPL-CCNC: 21 U/L (ref 11–66)
AST SERPL-CCNC: 23 U/L (ref 5–40)
BASINOPHIL, AUTOMATED: 0 % (ref 0–3)
BASOPHILS ABSOLUTE: 0 THOU/MM3 (ref 0–0.1)
BILIRUB SERPL-MCNC: 0.2 MG/DL (ref 0.3–1.2)
BILIRUBIN DIRECT: < 0.2 MG/DL (ref 0–0.3)
BUN, WHOLE BLOOD: 36 MG/DL (ref 8–26)
CALCIUM SERPL-MCNC: 10.5 MG/DL (ref 8.5–10.5)
CHLORIDE, WHOLE BLOOD: 102 MEQ/L (ref 98–109)
CREATININE, WHOLE BLOOD: 2.3 MG/DL (ref 0.5–1.2)
EOSINOPHILS ABSOLUTE: 0 THOU/MM3 (ref 0–0.4)
EOSINOPHILS RELATIVE PERCENT: 0 % (ref 0–4)
GFR, ESTIMATED ,CON: 22 ML/MIN/1.73M2
GLUCOSE, WHOLE BLOOD: 153 MG/DL (ref 70–108)
HCT VFR BLD CALC: 26.7 % (ref 37–47)
HEMOGLOBIN: 8.1 GM/DL (ref 12–16)
IMMATURE GRANULOCYTES: 0 %
IONIZED CALCIUM, WHOLE BLOOD: 1.52 MMOL/L (ref 1.12–1.32)
LYMPHOCYTES # BLD: 4 % (ref 15–47)
LYMPHOCYTES ABSOLUTE: 0.5 THOU/MM3 (ref 1–4.8)
MCH RBC QN AUTO: 24.4 PG (ref 26–33)
MCHC RBC AUTO-ENTMCNC: 30.3 GM/DL (ref 32.2–35.5)
MCV RBC AUTO: 80 FL (ref 81–99)
MONOCYTES ABSOLUTE: 0.7 THOU/MM3 (ref 0.4–1.3)
MONOCYTES: 6 % (ref 0–12)
PDW BLD-RTO: 16.8 % (ref 11.5–14.5)
PLATELET # BLD: 260 THOU/MM3 (ref 130–400)
PMV BLD AUTO: 9.2 FL (ref 9.4–12.4)
POTASSIUM, WHOLE BLOOD: 3.8 MEQ/L (ref 3.5–4.9)
RBC # BLD: 3.32 MILL/MM3 (ref 4.2–5.4)
SEG NEUTROPHILS: 89 % (ref 43–75)
SEGMENTED NEUTROPHILS ABSOLUTE COUNT: 10.6 THOU/MM3 (ref 1.8–7.7)
SODIUM, WHOLE BLOOD: 132 MEQ/L (ref 138–146)
TOTAL CO2, WHOLE BLOOD: 21 MEQ/L (ref 23–33)
TOTAL PROTEIN: 6 G/DL (ref 6.1–8)
WBC # BLD: 11.8 THOU/MM3 (ref 4.8–10.8)

## 2022-12-05 PROCEDURE — 82310 ASSAY OF CALCIUM: CPT

## 2022-12-05 PROCEDURE — 99211 OFF/OP EST MAY X REQ PHY/QHP: CPT

## 2022-12-05 PROCEDURE — G8419 CALC BMI OUT NRM PARAM NOF/U: HCPCS | Performed by: NURSE PRACTITIONER

## 2022-12-05 PROCEDURE — 80047 BASIC METABLC PNL IONIZED CA: CPT

## 2022-12-05 PROCEDURE — 85025 COMPLETE CBC W/AUTO DIFF WBC: CPT

## 2022-12-05 PROCEDURE — 1123F ACP DISCUSS/DSCN MKR DOCD: CPT | Performed by: NURSE PRACTITIONER

## 2022-12-05 PROCEDURE — 4004F PT TOBACCO SCREEN RCVD TLK: CPT | Performed by: NURSE PRACTITIONER

## 2022-12-05 PROCEDURE — 3017F COLORECTAL CA SCREEN DOC REV: CPT | Performed by: NURSE PRACTITIONER

## 2022-12-05 PROCEDURE — 99203 OFFICE O/P NEW LOW 30 MIN: CPT | Performed by: NURSE PRACTITIONER

## 2022-12-05 PROCEDURE — 1090F PRES/ABSN URINE INCON ASSESS: CPT | Performed by: NURSE PRACTITIONER

## 2022-12-05 PROCEDURE — G8427 DOCREV CUR MEDS BY ELIG CLIN: HCPCS | Performed by: NURSE PRACTITIONER

## 2022-12-05 PROCEDURE — 80076 HEPATIC FUNCTION PANEL: CPT

## 2022-12-05 PROCEDURE — G8484 FLU IMMUNIZE NO ADMIN: HCPCS | Performed by: NURSE PRACTITIONER

## 2022-12-05 PROCEDURE — G8400 PT W/DXA NO RESULTS DOC: HCPCS | Performed by: NURSE PRACTITIONER

## 2022-12-05 NOTE — PROGRESS NOTES
[address] [address]  Dept: [phone]  Loc: [phone]       Visit Date:12/5/2022     Jovana Tyler is a 78 y.o. female who presents today for:   Chief Complaint   Patient presents with    New Patient     HREIBERTO, Hypercalcemia        HPI:   Jovana Tyler is a 78 y.o. female referred to Hematology/Oncology clinic by Nneka Poole MD for evaluation of Hypercalcemia. The patient is a resident at the [facility] in [facility]. She is confined to a wheelchair or bed the majority of her time. She was previously on calcium plus vitamin D supplements which were discontinued 6 days ago by nephrology, Dr. Tonya Bush. The patient denies any bone pain. She is on home O2 for her history of COPD. She has a harsh productive cough with clear sputum. No fevers or night sweats. She denies any chest pain. No abdominal pain or GI issues. PMH, SH, and FH:  I reviewed the patient's medication and allergy lists. The PMH, SH, and FH were also reviewed as noted on the EMR.         Past Medical History:   Diagnosis Date    Anemia     Heart abnormality     Hypertension       Past Surgical History:   Procedure Laterality Date    CT BIOPSY RENAL  11/17/2022    CT BIOPSY RENAL 11/17/2022 [facility] CT SCAN    HEART CHAMBER REVISION      WRIST SURGERY        Family History   Problem Relation Age of Onset    Cancer Sister       Social History     Tobacco Use    Smoking status: Every Day    Smokeless tobacco: Never    Tobacco comments:     2 cigarettes a day   Substance Use Topics    Alcohol use: Not on file      Current Outpatient Medications   Medication Sig Dispense Refill    midodrine (PROAMATINE) 5 MG tablet Take 1 tablet by mouth 3 times daily 90 tablet 3    Acetylcysteine, Nutrient, (N-ACETYL CYSTEINE) 600 MG TABS Take by mouth      pantoprazole (PROTONIX) 40 MG tablet Take by mouth every morning (before breakfast)      sucralfate (CARAFATE) 1 GM tablet Take 1 g by mouth in the morning and 1 g at noon and 1 g in the evening and 1 g before bedtime. ondansetron (ZOFRAN) 4 MG tablet Take 4 mg by mouth every 8 hours as needed for Nausea or Vomiting      metoprolol tartrate (LOPRESSOR) 25 MG tablet Take 0.5 tablets by mouth 2 times daily (Patient taking differently: Take 12.5 mg by mouth daily Hold if SBP <100, DBP<60 or pulse >60) 60 tablet 1    albuterol sulfate HFA (PROVENTIL;VENTOLIN;PROAIR) 108 (90 Base) MCG/ACT inhaler Inhale 2 puffs into the lungs every 4 hours as needed for Wheezing      Levothyroxine Sodium 100 MCG CAPS Take 100 mcg by mouth Daily      polyethylene glycol (GLYCOLAX) 17 GM/SCOOP powder Take 17 g by mouth daily as needed      acetaminophen (TYLENOL) 325 MG tablet Take 325 mg by mouth every 4 hours      amiodarone (CORDARONE) 200 MG tablet Take 200 mg by mouth in the morning. DULoxetine (CYMBALTA) 30 MG extended release capsule Take 30 mg by mouth daily      DULoxetine (CYMBALTA) 60 MG extended release capsule Take 60 mg by mouth daily Takes w 30 mg      OXYGEN Inhale into the lungs       No current facility-administered medications for this visit. Allergies   Allergen Reactions    Amoxicillin      Pt states that she gets thrush            Review of Systems:   Review of Systems   Pertinent review of systems noted in HPI, all other ROS negative. Objective:   Physical Exam   BP (!) 140/69 (Site: Right Upper Arm, Position: Sitting, Cuff Size: Medium Adult)   Pulse 78   Temp 98.8 °F (37.1 °C) (Oral)   Resp 20   Wt 88 lb (39.9 kg)   SpO2 92%   BMI 16.10 kg/m²    General appearance: No apparent distress, calm and cooperative. HEENT: Pupils equal, round, and reactive to light. Conjunctivae/corneas clear. Oral mucosa intact  Neck: Supple, with full range of motion. Trachea midline. Respiratory:  Normal respiratory effort. Crackles throughout.   Chronic harsh productive cough  Cardiovascular: Regular rate and rhythm with normal S1/S2 without murmurs, rubs or gallops. Abdomen: Soft, non-tender, non-distended with active bowel sounds. Musculoskeletal: No clubbing, cyanosis or edema bilaterally. Skin: Skin color, texture, turgor normal.  No visible rashes or lesions. Neurologic:  Neurovascularly intact without any focal sensory/motor deficits. Psychiatric: Alert and oriented, thought content appropriate, normal insight  Capillary Refill: Brisk,< 3 seconds   Peripheral Pulses: +2 palpable, equal bilaterally       Imaging Studies and Labs:   CBC:   Lab Results   Component Value Date    WBC 11.8 (H) 12/05/2022    HGB 8.1 (L) 12/05/2022    HCT 26.7 (L) 12/05/2022    MCV 80 (L) 12/05/2022     12/05/2022     BMP:   Lab Results   Component Value Date/Time     12/05/2022 02:09 PM     11/22/2022 12:00 AM    K 3.8 12/05/2022 02:09 PM    K 3.9 11/22/2022 12:00 AM     11/22/2022 12:00 AM    CO2 20 11/22/2022 12:00 AM    BUN 34 11/22/2022 12:00 AM    CREATININE 2.3 12/05/2022 02:09 PM    CREATININE 2.6 11/22/2022 12:00 AM    GLUCOSE 131 11/22/2022 12:00 AM    CALCIUM 10.5 12/05/2022 02:09 PM      LFT:   Lab Results   Component Value Date    ALT 21 12/05/2022    AST 23 12/05/2022    ALKPHOS 111 12/05/2022    BILITOT 0.2 (L) 12/05/2022         Assessment and Plan:   1. Hypercalcemia  - CBC with Auto Differential; Future  - Hepatic Function Panel; Future  - POC PANEL BMP W/IOCA; Future  - Calcium; Future    Return will call. All patient questions answered. Pt voiced understanding. Patient agreed with treatment plan. Follow up as directed. Patient instructed to call for questions or concerns.      Electronically signed by   GISELLE David - MAIK

## 2022-12-06 ENCOUNTER — OFFICE VISIT (OUTPATIENT)
Dept: UROLOGY | Age: 77
End: 2022-12-06

## 2022-12-06 VITALS — WEIGHT: 88 LBS | BODY MASS INDEX: 16.2 KG/M2 | HEIGHT: 62 IN

## 2022-12-06 DIAGNOSIS — N13.30 BILATERAL HYDRONEPHROSIS: Primary | ICD-10-CM

## 2022-12-06 NOTE — PROGRESS NOTES
Patient:  Mila William  YOB: 1945  Date: 12/6/2022    HISTORY OF PRESENT ILLNESS:   The patient is a 78 y.o. female who presents today for evaluation of the following problems:        12/6/22   -Presents to the office with nursing home staff.     -Patient presents as a referral from nephrology for bilateral hydronephrosis per renal US on 9/23/2022  -creatinine has been worsening over the past few months   -renal biopsy without significant abnormality   -per nephrology, if renal function continues to decline, she will need renal replacement therapy  -states she has recurrent UTIs and was treated 5 days ago. Not currently on antibiotic therapy.   -Diane catheter in place per patient  -Denies history of kidney stone   -cysto bilateral retrograde, light sedation  - She denies having any pain at this time    Urinalysis today:  No results found for this visit on 12/06/22. Last BUN and creatinine:  Lab Results   Component Value Date    BUN 34 11/22/2022     Lab Results   Component Value Date    CREATININE 2.3 (H) 12/05/2022       Imaging Reviewed during this Office Visit:   (results were independently reviewed by physician and radiology report verified)  I independently reviewed and verified the images and reports from:    No results found.       PAST MEDICAL, FAMILY AND SOCIAL HISTORY:  Past Medical History:   Diagnosis Date    Anemia     Heart abnormality     Hypertension      Past Surgical History:   Procedure Laterality Date    CT BIOPSY RENAL  11/17/2022    CT BIOPSY RENAL 11/17/2022 [facility] CT SCAN    HEART CHAMBER REVISION      WRIST SURGERY       Family History   Problem Relation Age of Onset    Cancer Sister      Outpatient Medications Marked as Taking for the 12/6/22 encounter (Office Visit) with Ericka Becker PA-C   Medication Sig Dispense Refill    midodrine (PROAMATINE) 5 MG tablet Take 1 tablet by mouth 3 times daily 90 tablet 3    Acetylcysteine, Nutrient, (N-ACETYL CYSTEINE) 600 MG TABS Take by mouth      pantoprazole (PROTONIX) 40 MG tablet Take by mouth every morning (before breakfast)      sucralfate (CARAFATE) 1 GM tablet Take 1 g by mouth in the morning and 1 g at noon and 1 g in the evening and 1 g before bedtime. ondansetron (ZOFRAN) 4 MG tablet Take 4 mg by mouth every 8 hours as needed for Nausea or Vomiting      OXYGEN Inhale into the lungs      metoprolol tartrate (LOPRESSOR) 25 MG tablet Take 0.5 tablets by mouth 2 times daily (Patient taking differently: Take 12.5 mg by mouth daily Hold if SBP <100, DBP<60 or pulse >60) 60 tablet 1    albuterol sulfate HFA (PROVENTIL;VENTOLIN;PROAIR) 108 (90 Base) MCG/ACT inhaler Inhale 2 puffs into the lungs every 4 hours as needed for Wheezing      Levothyroxine Sodium 100 MCG CAPS Take 100 mcg by mouth Daily      polyethylene glycol (GLYCOLAX) 17 GM/SCOOP powder Take 17 g by mouth daily as needed      acetaminophen (TYLENOL) 325 MG tablet Take 325 mg by mouth every 4 hours      amiodarone (CORDARONE) 200 MG tablet Take 200 mg by mouth in the morning. DULoxetine (CYMBALTA) 30 MG extended release capsule Take 30 mg by mouth daily      DULoxetine (CYMBALTA) 60 MG extended release capsule Take 60 mg by mouth daily Takes w 30 mg         Amoxicillin  Social History     Tobacco Use   Smoking Status Every Day   Smokeless Tobacco Never   Tobacco Comments    2 cigarettes a day       Social History     Substance and Sexual Activity   Alcohol Use None       REVIEW OF SYSTEMS:  Constitutional: negative  Eyes: negative  Respiratory: negative  Cardiovascular: negative  Gastrointestinal: negative  Genitourinary: negative except for what is in HPI  Musculoskeletal: negative  Skin: negative   Neurological: negative  Hematological/Lymphatic: negative  Psychological: negative    Physical Exam:    This a 78 y.o. female    There were no vitals filed for this visit. Constitutional: Patient in no acute distress.   Neuro: Alert and oriented to person, place and time. Psych: mood and affect normal  HEENT negative  Lungs: Respiratory effort is normal  Cardiovascular: Normal peripheral pulses  Abdomen: Soft, non-tender, non-distended   Lymphatics: No palpable lymphadenopathy. Bladder non-tender and not distended. Assessment and Plan   CKD Stage 3    -referred by nephrology. The patient follows with Dr. Cande Corona. Renal US remarkable for bilateral mild hydronephrosis on 9/23/2022.    -patient with worsening creatinine   -recently at [facility] with acute kidney injury  -had recent renal biopsy which per nephrology did not show significant abnormalities- only mild tubulointerstitial scarring.   -Nephrology also reports that the patient will require renal replacement therapy should her renal function continues to decline    -discussed case with Dr. Kellie Gonzales  -recommended cystoscopy, bilateral retrograde pyelogram in the OR  -I described the procedure in detail and also described the associated risks and benefits at length. We discussed possible alternative therapies. We discussed the risks and benefits of not undergoing therapy. Patient understands these risks and benefits and will call with their decision after discussing the procedure with their nursing home attending provider, and family whom makes her medical decisions.   -Nursing home will call with decision. The nursing home was encouraged to call with any other questions, comments, or concerns, and to have her family do the same.       Carolina Fabian PA-C  Urology

## 2022-12-16 ENCOUNTER — TELEPHONE (OUTPATIENT)
Dept: UROLOGY | Age: 77
End: 2022-12-16

## 2022-12-16 DIAGNOSIS — N13.30 BILATERAL HYDRONEPHROSIS: Primary | ICD-10-CM

## 2022-12-16 DIAGNOSIS — Z01.818 PRE-OP TESTING: ICD-10-CM

## 2022-12-16 DIAGNOSIS — N39.0 RECURRENT UTI: ICD-10-CM

## 2022-12-16 NOTE — TELEPHONE ENCOUNTER
Patient is scheduled with Dr. Aquiles Mejia on 1/6/23. Surgery consent to be done upon arrival.  Patient to do urine culture, EKG and chest xray on 12/23/22; orders faxed to [facility]. Surgery instructions faxed to [facility]; spoke with nurse Tex Tapia.

## 2022-12-16 NOTE — TELEPHONE ENCOUNTER
SURGERY [address] [facility] [address] [facility], [address]      Phone *[phone] *[phone]   Surgical Scheduling Direct Line Phone *[phone] Fax *[address] [facility] 1945 female    [address] [facility] [place] [address]   Marital Status:           Home Phone: [phone]      Cell Phone:    Telephone Information:   Mobile [phone]          Surgeon: Dr. Curtis Goel Surgery Date: 01/06/2023   Time: 1:00pm    Procedure: Cystoscopy, Bilateral Retrograde Pyelogram    Diagnosis: Bilateral hydronephrosis     Important Medical History:  In Epic                          lives at the [address] [place] [address]    Special Inst/Equip:     CPT Codes:    74108  Latex Allergy: No     Cardiac Device:  No    Anesthesia:  MAC          Admission Type:  Same Day                        Admit Prior to Day of Surgery: No    Case Location:  Main OR            Preadmission Testing:  Phone Call          PAT Date and Time:______________________________________________________    PAT Confirmation #: ______________________________________________________    Post Op Visit: ___________________________________________________________    Need Preop Cardiac Clearance: No    Does Patient have Cardiologist/physician?     none    Surgery Confirmation #: __________________________________________________    : ________________________   Date: __________________________     [employer] Name: Medicare

## 2022-12-16 NOTE — TELEPHONE ENCOUNTER
DO NOT TAKE ASPIRIN,  FISH OIL, IBUPROFEN, MOTRIN-LIKE DRUGS AND ANY MULTIVITAMINS OR OVER THE COUNTER SUPPLEMENTS 14 DAYS PRIOR TO SURGERY. MUST HAVE AN ADULT OVER THE AGE OF 18 WITH YOU AT THE TIME OF THE PROCEDURE AND WITH YOU AT HOME AFTER THE PROCEDURE FOR [address] [place] [address] Dr JUVENAL Wilson 1945 Diagnosis:     Surgical Physician: Dr. Su Martin have been scheduled for the procedure marked below:      Surgery: Cystoscopy, Bilateral Retrograde Pyelogram         Date: 1/6/2023     Anesthesia:  MAC      Place of Service: [facility] Second Floor Same Day Surgery         Arrive to same day surgery by:  11:00am  (Surgery time is subject to change)      INSTRUCTIONS AS MARKED BELOW:    1.  DO NOT eat or drink anything after midnight before surgery. 2.  We prefer you shower or bathe with an antibacterial soap (Dial) the morning of surgery. 3  Please bring a current medication list, photo ID and insurance card(s) with you  4. Okay to take Tylenol  5. If you take Glucophage, Metformin or Janumet, hold 48-hours prior to surgery  6. Take blood pressure or heart medication as directed, if taken in the morning take with a small sip of water  7. The office will call you in 1-2 days after your procedure to schedule a follow up. DATE SENSITIVE TESTING-DO ON THE DATE LISTED*WALK IN *([facility] IN [facility]) NO APPOINTMENT    DO URINE CULTURE, EKG AND CHEST XRAY ON 12/23/22. ORDERS INCLUDED.         Date: 12/16/2022

## 2022-12-21 DIAGNOSIS — E83.52 HYPERCALCEMIA: Primary | ICD-10-CM

## 2022-12-30 ENCOUNTER — PREP FOR PROCEDURE (OUTPATIENT)
Dept: UROLOGY | Age: 77
End: 2022-12-30

## 2022-12-30 RX ORDER — SODIUM CHLORIDE 0.9 % (FLUSH) 0.9 %
5-40 SYRINGE (ML) INJECTION PRN
Status: CANCELLED | OUTPATIENT
Start: 2022-12-30

## 2022-12-30 RX ORDER — SODIUM CHLORIDE 0.9 % (FLUSH) 0.9 %
5-40 SYRINGE (ML) INJECTION EVERY 12 HOURS SCHEDULED
Status: CANCELLED | OUTPATIENT
Start: 2022-12-30

## 2022-12-30 RX ORDER — IPRATROPIUM BROMIDE AND ALBUTEROL SULFATE 2.5; .5 MG/3ML; MG/3ML
1 SOLUTION RESPIRATORY (INHALATION) EVERY 4 HOURS PRN
Status: CANCELLED | OUTPATIENT
Start: 2022-12-30

## 2022-12-30 RX ORDER — SODIUM CHLORIDE 9 MG/ML
INJECTION, SOLUTION INTRAVENOUS PRN
Status: CANCELLED | OUTPATIENT
Start: 2022-12-30

## 2022-12-30 NOTE — PROGRESS NOTES
Spoke to  Pam Oviedo  at [facility] [address] [phone]    NPO: take heart and BP medications am of surgery with small sip of water    Is patient ambulatory? Transer in wheelchair   Does patient use assistive devices?walker  Is patient non-weight bearing? no    How many people does it take to move patient? 1    Fax: MAR, allergy list, medical/surgical history    General instructions:  NPO after midnight  Bring insurance info and drivers license  Wear comfortable clean clothing  Do not bring jewelry   Shower night before and morning of surgery with a liquid antibacterial soap  Follow all instructions given by your physician   needed at discharge

## 2023-01-03 RX ORDER — GUAIFENESIN 100 MG/5ML
200 SYRUP ORAL 3 TIMES DAILY PRN
COMMUNITY

## 2023-01-06 ENCOUNTER — ANESTHESIA (OUTPATIENT)
Dept: OPERATING ROOM | Age: 78
End: 2023-01-06
Payer: MEDICARE

## 2023-01-06 ENCOUNTER — ANESTHESIA EVENT (OUTPATIENT)
Dept: OPERATING ROOM | Age: 78
End: 2023-01-06
Payer: MEDICARE

## 2023-01-06 ENCOUNTER — HOSPITAL ENCOUNTER (OUTPATIENT)
Age: 78
Setting detail: OUTPATIENT SURGERY
Discharge: OTHER INSTITUTION | End: 2023-01-06
Attending: UROLOGY | Admitting: UROLOGY
Payer: MEDICARE

## 2023-01-06 VITALS
DIASTOLIC BLOOD PRESSURE: 69 MMHG | TEMPERATURE: 96.7 F | BODY MASS INDEX: 14.35 KG/M2 | SYSTOLIC BLOOD PRESSURE: 147 MMHG | WEIGHT: 78 LBS | HEART RATE: 75 BPM | OXYGEN SATURATION: 100 % | HEIGHT: 62 IN | RESPIRATION RATE: 18 BRPM

## 2023-01-06 PROCEDURE — 7100000011 HC PHASE II RECOVERY - ADDTL 15 MIN: Performed by: UROLOGY

## 2023-01-06 PROCEDURE — 3600000002 HC SURGERY LEVEL 2 BASE: Performed by: UROLOGY

## 2023-01-06 PROCEDURE — 3700000001 HC ADD 15 MINUTES (ANESTHESIA): Performed by: UROLOGY

## 2023-01-06 PROCEDURE — 2709999900 HC NON-CHARGEABLE SUPPLY: Performed by: UROLOGY

## 2023-01-06 PROCEDURE — C1769 GUIDE WIRE: HCPCS | Performed by: UROLOGY

## 2023-01-06 PROCEDURE — C1758 CATHETER, URETERAL: HCPCS | Performed by: UROLOGY

## 2023-01-06 PROCEDURE — 6360000004 HC RX CONTRAST MEDICATION: Performed by: UROLOGY

## 2023-01-06 PROCEDURE — 3600000012 HC SURGERY LEVEL 2 ADDTL 15MIN: Performed by: UROLOGY

## 2023-01-06 PROCEDURE — 6360000002 HC RX W HCPCS: Performed by: UROLOGY

## 2023-01-06 PROCEDURE — 6360000002 HC RX W HCPCS: Performed by: REGISTERED NURSE

## 2023-01-06 PROCEDURE — 3700000000 HC ANESTHESIA ATTENDED CARE: Performed by: UROLOGY

## 2023-01-06 PROCEDURE — 7100000010 HC PHASE II RECOVERY - FIRST 15 MIN: Performed by: UROLOGY

## 2023-01-06 PROCEDURE — 2580000003 HC RX 258: Performed by: UROLOGY

## 2023-01-06 RX ORDER — SODIUM CHLORIDE 0.9 % (FLUSH) 0.9 %
5-40 SYRINGE (ML) INJECTION PRN
Status: DISCONTINUED | OUTPATIENT
Start: 2023-01-06 | End: 2023-01-06 | Stop reason: HOSPADM

## 2023-01-06 RX ORDER — IPRATROPIUM BROMIDE AND ALBUTEROL SULFATE 2.5; .5 MG/3ML; MG/3ML
1 SOLUTION RESPIRATORY (INHALATION) EVERY 4 HOURS PRN
Status: DISCONTINUED | OUTPATIENT
Start: 2023-01-06 | End: 2023-01-06 | Stop reason: HOSPADM

## 2023-01-06 RX ORDER — PROPOFOL 10 MG/ML
INJECTION, EMULSION INTRAVENOUS PRN
Status: DISCONTINUED | OUTPATIENT
Start: 2023-01-06 | End: 2023-01-06 | Stop reason: SDUPTHER

## 2023-01-06 RX ORDER — SODIUM CHLORIDE 0.9 % (FLUSH) 0.9 %
5-40 SYRINGE (ML) INJECTION EVERY 12 HOURS SCHEDULED
Status: DISCONTINUED | OUTPATIENT
Start: 2023-01-06 | End: 2023-01-06 | Stop reason: HOSPADM

## 2023-01-06 RX ORDER — SODIUM CHLORIDE 9 MG/ML
INJECTION, SOLUTION INTRAVENOUS PRN
Status: DISCONTINUED | OUTPATIENT
Start: 2023-01-06 | End: 2023-01-06 | Stop reason: HOSPADM

## 2023-01-06 RX ORDER — TRIMETHOPRIM 100 MG/1
100 TABLET ORAL DAILY
Qty: 10 TABLET | Refills: 0 | Status: SHIPPED | OUTPATIENT
Start: 2023-01-06 | End: 2023-01-16

## 2023-01-06 RX ADMIN — CEFAZOLIN SODIUM 2000 MG: 10 INJECTION, POWDER, FOR SOLUTION INTRAVENOUS at 13:57

## 2023-01-06 RX ADMIN — PROPOFOL 100 MG: 10 INJECTION, EMULSION INTRAVENOUS at 13:55

## 2023-01-06 RX ADMIN — SODIUM CHLORIDE: 9 INJECTION, SOLUTION INTRAVENOUS at 12:18

## 2023-01-06 ASSESSMENT — PAIN - FUNCTIONAL ASSESSMENT: PAIN_FUNCTIONAL_ASSESSMENT: NONE - DENIES PAIN

## 2023-01-06 ASSESSMENT — PAIN SCALES - GENERAL
PAINLEVEL_OUTOF10: 0
PAINLEVEL_OUTOF10: 0

## 2023-01-06 NOTE — H&P
Bobbi Gonsalves MD  History and Physical    Patient:  Mary Pantoja  MRN: [id number]  YOB: 1945    HISTORY OF PRESENT ILLNESS:     The patient is a 78 y.o. female who presents with hematuria. Here for procedure. Patient's old records, notes and chart reviewed and summarized above. Bobbi Gonsalves MD independently reviewed the images and verified the radiology reports from:    No results found. Past Medical History:    Past Medical History:   Diagnosis Date    Anemia     Heart abnormality     Hypertension        Past Surgical History:    Past Surgical History:   Procedure Laterality Date    CT BIOPSY RENAL  11/17/2022    CT BIOPSY RENAL 11/17/2022 [facility] CT SCAN    HEART CHAMBER REVISION      WRIST SURGERY         Medications Prior to Admission:    Prior to Admission medications    Medication Sig Start Date End Date Taking?  Authorizing Provider   guaiFENesin (ROBITUSSIN MUCUS+CHEST CONGEST) 100 MG/5ML liquid Take 200 mg by mouth 3 times daily as needed for Cough   Yes Historical Provider, MD   ammonium lactate (LAC-HYDRIN) 5 % LOTN lotion Apply 1 application topically as needed Apply to skin folds or web spaces   Yes Historical Provider, MD   midodrine (PROAMATINE) 5 MG tablet Take 1 tablet by mouth 3 times daily  Patient taking differently: Take 5 mg by mouth 3 times daily With meals 11/29/22   My Freedman MD   Acetylcysteine, Nutrient, (N-ACETYL CYSTEINE) 600 MG TABS Take by mouth  Patient not taking: Reported on 1/3/2023    Historical Provider, MD   pantoprazole (PROTONIX) 40 MG tablet Take by mouth every morning (before breakfast)    Historical Provider, MD   sucralfate (CARAFATE) 1 GM tablet Take 1 g by mouth 4 times daily Before meals    Historical Provider, MD   ondansetron (ZOFRAN) 4 MG tablet Take 4 mg by mouth every 8 hours as needed for Nausea or Vomiting    Historical Provider, MD   OXYGEN Inhale into the lungs    Historical Provider, MD   metoprolol tartrate (LOPRESSOR) 25 MG tablet Take 0.5 tablets by mouth 2 times daily  Patient not taking: Reported on 1/3/2023 4/19/22   Leonard Armstrong MD   albuterol sulfate HFA (PROVENTIL;VENTOLIN;PROAIR) 108 (90 Base) MCG/ACT inhaler Inhale 2 puffs into the lungs every 4 hours as needed for Wheezing    Historical Provider, MD   Levothyroxine Sodium 100 MCG CAPS Take 100 mcg by mouth Daily    Historical Provider, MD   polyethylene glycol (GLYCOLAX) 17 GM/SCOOP powder Take 17 g by mouth daily as needed    Historical Provider, MD   acetaminophen (TYLENOL) 325 MG tablet Take 325 mg by mouth every 4 hours    Historical Provider, MD   amiodarone (CORDARONE) 200 MG tablet Take 200 mg by mouth in the morning. Historical Provider, MD   DULoxetine (CYMBALTA) 30 MG extended release capsule Take 30 mg by mouth daily 12/28/20   Historical Provider, MD   DULoxetine (CYMBALTA) 60 MG extended release capsule Take 60 mg by mouth daily Takes w 30 mg 12/28/20   Historical Provider, MD       Allergies:  Amoxicillin    Social History:    Social History     Socioeconomic History    Marital status:       Spouse name: Not on file    Number of children: Not on file    Years of education: Not on file    Highest education level: Not on file   Occupational History    Not on file   Tobacco Use    Smoking status: Every Day    Smokeless tobacco: Never    Tobacco comments:     2 cigarettes a day   Substance and Sexual Activity    Alcohol use: Not on file    Drug use: Not on file    Sexual activity: Not on file   Other Topics Concern    Not on file   Social History Narrative    Not on file     Social Determinants of Health     Financial Resource Strain: Not on file   Food Insecurity: Not on file   Transportation Needs: Not on file   Physical Activity: Not on file   Stress: Not on file   Social Connections: Not on file   Intimate Partner Violence: Not on file   Housing Stability: Not on file       Family History:    Family History   Problem Relation Age of Onset Cancer Sister        REVIEW OF SYSTEMS:  Constitutional: negative  Eyes: negative  Respiratory: negative  Cardiovascular: negative  Gastrointestinal: negative  Genitourinary: no acute issues  Musculoskeletal: negative  Skin: negative   Neurological: negative  Hematological/Lymphatic: negative  Psychological: negative    Physical Exam:      No data found. Constitutional: Patient in no acute distress; Neuro: alert and oriented to person place and time. Psych: Mood and affect normal.  Skin: Normal  Lungs: Respiratory effort normal, CTA  Cardiovascular:  Normal peripheral pulses; no murmur. Normal rhythm  Abdomen: Soft, non-tender, non-distended with no CVA, flank pain, hepatosplenomegaly or hernia. Kidneys normal.  Bladder non-tender and not distended. LABS:   No results for input(s): WBC, HGB, HCT, MCV, PLT in the last 72 hours. No results for input(s): NA, K, CL, CO2, PHOS, BUN, CREATININE, CA in the last 72 hours. No results found for: PSA      Urinalysis: No results for input(s): COLORU, PHUR, LABCAST, WBCUA, RBCUA, MUCUS, TRICHOMONAS, YEAST, BACTERIA, CLARITYU, SPECGRAV, LEUKOCYTESUR, UROBILINOGEN, [place] in the last 72 hours. Invalid input(s): NITRATE, GLUCOSEUKETONESUAMORPHOUS     -----------------------------------------------------------------      Assessment and Plan     Impression:  There is no problem list on file for this patient.       Plan:     Consent obtained; cysto leigh ann retrogrades in OR today    Yunior Story MD  7:00 AM 1/6/2023

## 2023-01-06 NOTE — ANESTHESIA POSTPROCEDURE EVALUATION
Department of Anesthesiology  Postprocedure Note    Patient: Ron Estrada  MRN: [id number]  YOB: 1945  Date of evaluation: 1/6/2023      Procedure Summary     Date: 01/06/23 Room / Location: [facility] / [facility]    Anesthesia Start: 1350 Anesthesia Stop: 2006    Procedure: Cystoscopy Bilatereal Retrograde Pyelogram (Bilateral) Diagnosis:       Bilateral hydronephrosis      (Bilateral hydronephrosis [N13.30])    Surgeons: Josselyn Anguiano MD Responsible Provider: Shay Martin MD    Anesthesia Type: MAC ASA Status: 3          Anesthesia Type: No value filed.     Arlette Phase I: Arlette Score: 7    Arlette Phase II:        Anesthesia Post Evaluation    Complications: no

## 2023-01-06 NOTE — PROGRESS NOTES
Pt returned to [facility] room 2. Vitals and assessment as charted. 0.9 infusing, @950ml to count from PACU. Pt has water. Pt verbalized understanding of discharge criteria and call light use. Call light in reach.

## 2023-01-06 NOTE — ANESTHESIA PRE PROCEDURE
Department of Anesthesiology  Preprocedure Note       Name:  Dileep Busch   Age:  78 y.o.  :  1945                                          MRN:  [id number]         Date:  2023      Surgeon: Majo Malhotra):  Janae Le MD    Procedure: Procedure(s):  Cystoscopy Bilatereal Retrograde Pyelogram    Medications prior to admission:   Prior to Admission medications    Medication Sig Start Date End Date Taking?  Authorizing Provider   guaiFENesin (ROBITUSSIN MUCUS+CHEST CONGEST) 100 MG/5ML liquid Take 200 mg by mouth 3 times daily as needed for Cough   Yes Historical Provider, MD   ammonium lactate (LAC-HYDRIN) 5 % LOTN lotion Apply 1 application topically as needed Apply to skin folds or web spaces   Yes Historical Provider, MD   midodrine (PROAMATINE) 5 MG tablet Take 1 tablet by mouth 3 times daily  Patient taking differently: Take 5 mg by mouth 3 times daily With meals 22   Lynne Chacon MD   Acetylcysteine, Nutrient, (N-ACETYL CYSTEINE) 600 MG TABS Take by mouth  Patient not taking: Reported on 1/3/2023    Historical Provider, MD   pantoprazole (PROTONIX) 40 MG tablet Take by mouth every morning (before breakfast)    Historical Provider, MD   sucralfate (CARAFATE) 1 GM tablet Take 1 g by mouth 4 times daily Before meals    Historical Provider, MD   ondansetron (ZOFRAN) 4 MG tablet Take 4 mg by mouth every 8 hours as needed for Nausea or Vomiting    Historical Provider, MD   OXYGEN Inhale into the lungs    Historical Provider, MD   metoprolol tartrate (LOPRESSOR) 25 MG tablet Take 0.5 tablets by mouth 2 times daily  Patient not taking: Reported on 1/3/2023 4/19/22   Lynne Chacon MD   albuterol sulfate HFA (PROVENTIL;VENTOLIN;PROAIR) 108 (90 Base) MCG/ACT inhaler Inhale 2 puffs into the lungs every 4 hours as needed for Wheezing    Historical Provider, MD   Levothyroxine Sodium 100 MCG CAPS Take 100 mcg by mouth Daily    Historical Provider, MD   polyethylene glycol (GLYCOLAX) 17 GM/SCOOP powder Take 17 g by mouth daily as needed    Historical Provider, MD   acetaminophen (TYLENOL) 325 MG tablet Take 325 mg by mouth every 4 hours    Historical Provider, MD   amiodarone (CORDARONE) 200 MG tablet Take 200 mg by mouth in the morning. Historical Provider, MD   DULoxetine (CYMBALTA) 30 MG extended release capsule Take 30 mg by mouth daily 12/28/20   Historical Provider, MD   DULoxetine (CYMBALTA) 60 MG extended release capsule Take 60 mg by mouth daily Takes w 30 mg 12/28/20   Historical Provider, MD       Current medications:    Current Facility-Administered Medications   Medication Dose Route Frequency Provider Last Rate Last Admin    sodium chloride flush 0.9 % injection 5-40 mL  5-40 mL IntraVENous 2 times per day Malachi Zhang MD        sodium chloride flush 0.9 % injection 5-40 mL  5-40 mL IntraVENous PRN Malachi Zhang MD        0.9 % sodium chloride infusion   IntraVENous PRN Malachi Zhang MD        ceFAZolin (ANCEF) 2000 mg in dextrose 5 % 50 mL IVPB  2,000 mg IntraVENous On Call to [address] [place] [address] Place, MD        ipratropium-albuterol (DUONEB) nebulizer solution 1 ampule  1 ampule Inhalation Q4H PRN Malachi Zhang MD           Allergies: Allergies   Allergen Reactions    Amoxicillin      Pt states that she gets thrush         Problem List:  There is no problem list on file for this patient.       Past Medical History:        Diagnosis Date    Anemia     Anemia     Anxiety     Atrial fibrillation (HCC)     CHF (congestive heart failure) (HCC)     COPD (chronic obstructive pulmonary disease) (HCC)     Depression     Gastritis with bleeding     Heart abnormality     Hyperlipidemia     Hypertension     Hypothyroidism     Rheumatic disease of mitral valve        Past Surgical History:        Procedure Laterality Date    CT BIOPSY RENAL  11/17/2022    CT BIOPSY RENAL 11/17/2022 [facility] CT SCAN    HEART CHAMBER REVISION      WRIST SURGERY         Social History:    Social History     Tobacco Use    Smoking status: Every Day    Smokeless tobacco: Never    Tobacco comments:     2 cigarettes a day   Substance Use Topics    Alcohol use: Not on file                                Ready to quit: Not Answered  Counseling given: Not Answered  Tobacco comments: 2 cigarettes a day      Vital Signs (Current):   Vitals:    01/06/23 1155   BP: (!) 148/71   Pulse: 85   Resp: 16   Temp: (!) 96.2 °F (35.7 °C)   TempSrc: Tympanic   SpO2: 93%   Weight: 78 lb (35.4 kg)   Height: 5' 2\" (1.575 m)                                              BP Readings from Last 3 Encounters:   01/06/23 (!) 148/71   12/05/22 (!) 140/69   12/05/22 (!) 140/69       NPO Status: Time of last liquid consumption: 2100                        Time of last solid consumption: 1800                        Date of last liquid consumption: 01/05/23                        Date of last solid food consumption: 01/05/23    BMI:   Wt Readings from Last 3 Encounters:   01/06/23 78 lb (35.4 kg)   12/06/22 88 lb (39.9 kg)   12/05/22 88 lb (39.9 kg)     Body mass index is 14.27 kg/m².     CBC:   Lab Results   Component Value Date/Time    WBC 11.8 12/05/2022 02:09 PM    RBC 3.32 12/05/2022 02:09 PM    HGB 8.1 12/05/2022 02:09 PM    HCT 26.7 12/05/2022 02:09 PM    MCV 80 12/05/2022 02:09 PM    RDW 16.8 12/05/2022 02:09 PM     12/05/2022 02:09 PM       CMP:   Lab Results   Component Value Date/Time     12/05/2022 02:09 PM     11/22/2022 12:00 AM    K 3.8 12/05/2022 02:09 PM    K 3.9 11/22/2022 12:00 AM     11/22/2022 12:00 AM    CO2 20 11/22/2022 12:00 AM    BUN 34 11/22/2022 12:00 AM    CREATININE 2.3 12/05/2022 02:09 PM    CREATININE 2.6 11/22/2022 12:00 AM    LABGLOM 18 11/22/2022 12:00 AM    LABGLOM 22 11/17/2022 09:06 AM    GLUCOSE 131 11/22/2022 12:00 AM    PROT 6.0 12/05/2022 02:09 PM    CALCIUM 10.5 12/05/2022 02:09 PM    BILITOT 0.2 12/05/2022 02:09 PM    ALKPHOS 111 12/05/2022 02:09 PM    AST 23 12/05/2022 02:09 PM    ALT 21 12/05/2022 02:09 PM POC Tests: No results for input(s): POCGLU, POCNA, POCK, POCCL, POCBUN, POCHEMO, POCHCT in the last 72 hours. Coags: No results found for: PROTIME, INR, APTT    HCG (If Applicable): No results found for: PREGTESTUR, PREGSERUM, HCG, HCGQUANT     ABGs: No results found for: PHART, PO2ART, CQO2MFK, JGJ2CZY, BEART, P7IRZEQF     Type & Screen (If Applicable):  No results found for: LABABO, LABRH    Drug/Infectious Status (If Applicable):  No results found for: HIV, HEPCAB    COVID-19 Screening (If Applicable): No results found for: COVID19        Anesthesia Evaluation    Airway: Mallampati: II          Dental:          Pulmonary:   (+) COPD:                             Cardiovascular:    (+) hypertension:, CHF:,                   Neuro/Psych:   (+) psychiatric history:            GI/Hepatic/Renal:             Endo/Other:    (+) hypothyroidism::., .                 Abdominal:             Vascular: Other Findings:           Anesthesia Plan      MAC     ASA 3             Anesthetic plan and risks discussed with patient.                         Larry Bolanos MD   1/6/2023

## 2023-01-06 NOTE — PROGRESS NOTES
Report given to [address]  Waiting on Dr Sourav Luna to do discharge to give a full report    [phone] called and unable to reach anyone for rest of report

## 2023-01-06 NOTE — DISCHARGE INSTRUCTIONS
Pt ok to discharge home in good condition  No heavy lifting, >10 lbs for today  Pt should avoid strenuous activity for today  Pt should walk moderately at home  Pt ok to shower   Pt may resume diet as tolerated  Pt should take Rx as directed  No driving while on narcotics  Please call attending physician or hospital  with questions  Call or Present to ED if fever (> 101F), intractable nausea vomiting or pain.   Rx in chart    Pt should follow up with Brenda Dodson MD, in 12 weeks, call to confirm appointment

## 2023-01-08 NOTE — BRIEF OP NOTE
Brief Postoperative Note      Patient: Blaire Patel  YOB: 1945  MRN: [id number]    Date of Procedure: 1/6/2023    Pre-Op Diagnosis: Bilateral hydronephrosis [N13.30]    Post-Op Diagnosis: Same       Procedure(s):  Cystoscopy Bilatereal Retrograde Pyelogram    Surgeon(s):  Evon Phillips MD    Assistant:  * No surgical staff found *    Anesthesia: Monitor Anesthesia Care    Estimated Blood Loss (mL): Minimal    Complications: None    Specimens:   * No specimens in log *    Implants:  * No implants in log *      Drains:   [REMOVED] Urinary Catheter 01/06/23 2 Way (Removed)   Urine Color Sabrina 01/06/23 1512   Urine Appearance Cloudy 01/06/23 1512   Output (mL) 175 mL 01/06/23 1512       Findings: bl right worse than left hydronephrosis. Needs chronic  gomez. Poor ecog status. F/u 6 months hussein.  Needs gomez changed monthly    Electronically signed by Sarah Arciniega MD on 1/8/2023 at 4:45 PM

## 2023-01-08 NOTE — OP NOTE
[address] [place] [address]. [place]    DATE: 1/6/2023  Patient:  Joanne Weinstein  MRN: [id number]  YOB: 1945    SURGEON: Marian Taylor MD.    ASSISTANT: none    PREOPERATIVE DIAGNOSIS: bilateral hydronephrosis    POSTOPERATIVE DIAGNOSIS: bilateral hydronephrosis    PROCEDURE PERFORMED: Cystoscopy,  bilateral retrograde pyelogram    ANESTHESIA: Monitor Anesthesia Care    COMPLICATIONS: none    OR BLOOD LOSS:  Minimal    FLUIDS: Cystalloids per Anesthesia    SPECIMENS:  * No specimens in log *  none    DRAINS: gomez    INDICATIONS FOR PROCEDURE:  The patient is a 78 y.o. female who presents today with Bilateral hydronephrosis [N13.30] here for Cystoscopy Bilatereal Retrograde Pyelogram. After risks, benefits and alternatives of the procedure were discussed with the patient, the patient elected to proceed. DETAILS OF PROCEDURE:  After informed consent was obtained in the preoperative area, the patient was taken back to the operating room and transferred to the operating table in supine position. Anesthesia was induced and antibiotics were given. The patient was placed in modified dorsal lithotomy position and sterilely prepped and draped in a standard fashion. A timeout occurred. Two patient identifiers were used. We entered the urethra with a 22F scope with a 30 degree lens. The urine was cloudy       In the bladder a full 360 degree cystoscopy was performed. There were no papillary lesions or other mucosal abnormalities noted. Moderate trabeculations were noted. diverticula were not noted. Ureteral orifices were located in normal configuration. Retrograde pyelography was then completed. The right collection system demonstrated  hydronephrosis all the way down to the bladder. The left collection system demonstrated  hydronephrosis all the way down to the bladder      The bladder was drained. All instrumentation was removed.  The patient was awakened and discharged back to the PACU in good and stable condition. Hydronephrosis likely secondary to urinary retention. Pt poor surgical candidate.  Recommend conservative management with monthly cath changes

## 2023-01-09 ENCOUNTER — TELEPHONE (OUTPATIENT)
Dept: UROLOGY | Age: 78
End: 2023-01-09

## 2023-02-08 ENCOUNTER — OFFICE VISIT (OUTPATIENT)
Dept: NEPHROLOGY | Age: 78
End: 2023-02-08
Payer: MEDICARE

## 2023-02-08 VITALS — OXYGEN SATURATION: 100 % | HEART RATE: 72 BPM | DIASTOLIC BLOOD PRESSURE: 80 MMHG | SYSTOLIC BLOOD PRESSURE: 145 MMHG

## 2023-02-08 DIAGNOSIS — N17.9 AKI (ACUTE KIDNEY INJURY) (HCC): Primary | ICD-10-CM

## 2023-02-08 DIAGNOSIS — I95.89 OTHER HYPOTENSION: ICD-10-CM

## 2023-02-08 DIAGNOSIS — N18.30 STAGE 3 CHRONIC KIDNEY DISEASE, UNSPECIFIED WHETHER STAGE 3A OR 3B CKD (HCC): ICD-10-CM

## 2023-02-08 PROCEDURE — 1090F PRES/ABSN URINE INCON ASSESS: CPT | Performed by: INTERNAL MEDICINE

## 2023-02-08 PROCEDURE — G8427 DOCREV CUR MEDS BY ELIG CLIN: HCPCS | Performed by: INTERNAL MEDICINE

## 2023-02-08 PROCEDURE — G8400 PT W/DXA NO RESULTS DOC: HCPCS | Performed by: INTERNAL MEDICINE

## 2023-02-08 PROCEDURE — 1123F ACP DISCUSS/DSCN MKR DOCD: CPT | Performed by: INTERNAL MEDICINE

## 2023-02-08 PROCEDURE — 3017F COLORECTAL CA SCREEN DOC REV: CPT | Performed by: INTERNAL MEDICINE

## 2023-02-08 PROCEDURE — G8484 FLU IMMUNIZE NO ADMIN: HCPCS | Performed by: INTERNAL MEDICINE

## 2023-02-08 PROCEDURE — 4004F PT TOBACCO SCREEN RCVD TLK: CPT | Performed by: INTERNAL MEDICINE

## 2023-02-08 PROCEDURE — G8419 CALC BMI OUT NRM PARAM NOF/U: HCPCS | Performed by: INTERNAL MEDICINE

## 2023-02-08 PROCEDURE — 99213 OFFICE O/P EST LOW 20 MIN: CPT | Performed by: INTERNAL MEDICINE

## 2023-02-08 NOTE — PROGRESS NOTES
[facility]        Outpatient Follow-Up note         2/8/2023 12:56 PM    Patient Name:   Margy Pedraza  YOB: 1945  Primary Care Physician:  No primary care provider on file. [address] [place] [id number]  Dept: [phone]  Loc: [phone]     Chief Complaint / Reason for follow-up : Follow Up of CKD     Interval History :  Patient seen and examined by me.     Feels well denies any complaints   no chest pain or significant shortness of breath  Patient has seen hematology and also urology for bilateral hydronephrosis and had cystoscopy done  Now with a Diane catheter in place     Past History :  Past Medical History:   Diagnosis Date    Anemia     Anemia     Anxiety     Atrial fibrillation (HCC)     CHF (congestive heart failure) (HCC)     COPD (chronic obstructive pulmonary disease) ([facility] [address]     Depression     Gastritis with bleeding     Heart abnormality     Hyperlipidemia     Hypertension     Hypothyroidism     Rheumatic disease of mitral valve      Past Surgical History:   Procedure Laterality Date    CT BIOPSY RENAL  11/17/2022    CT BIOPSY RENAL 11/17/2022 [facility] CT SCAN    CYSTOSCOPY Bilateral 1/6/2023    Cystoscopy Bilatereal Retrograde Pyelogram performed by Kendell West MD at [address] [place] [address]          Medications :     Outpatient Medications Marked as Taking for the 2/8/23 encounter (Office Visit) with Reny Bradley MD   Medication Sig Dispense Refill    guaiFENesin (ROBITUSSIN) 100 MG/5ML liquid Take 200 mg by mouth 3 times daily as needed for Cough      ammonium lactate (LAC-HYDRIN) 5 % LOTN lotion Apply 1 application topically as needed Apply to skin folds or web spaces      midodrine (PROAMATINE) 5 MG tablet Take 1 tablet by mouth 3 times daily 90 tablet 3    Acetylcysteine, Nutrient, (N-ACETYL CYSTEINE) 600 MG TABS Take by mouth pantoprazole (PROTONIX) 40 MG tablet Take by mouth every morning (before breakfast)      sucralfate (CARAFATE) 1 GM tablet Take 1 g by mouth 4 times daily Before meals      ondansetron (ZOFRAN) 4 MG tablet Take 4 mg by mouth every 8 hours as needed for Nausea or Vomiting      OXYGEN Inhale into the lungs      metoprolol tartrate (LOPRESSOR) 25 MG tablet Take 0.5 tablets by mouth 2 times daily 60 tablet 1    albuterol sulfate HFA (PROVENTIL;VENTOLIN;PROAIR) 108 (90 Base) MCG/ACT inhaler Inhale 2 puffs into the lungs every 4 hours as needed for Wheezing      Levothyroxine Sodium 100 MCG CAPS Take 100 mcg by mouth Daily      polyethylene glycol (GLYCOLAX) 17 GM/SCOOP powder Take 17 g by mouth daily as needed      acetaminophen (TYLENOL) 325 MG tablet Take 325 mg by mouth every 4 hours      amiodarone (CORDARONE) 200 MG tablet Take 200 mg by mouth in the morning.       DULoxetine (CYMBALTA) 30 MG extended release capsule Take 30 mg by mouth daily      DULoxetine (CYMBALTA) 60 MG extended release capsule Take 60 mg by mouth daily Takes w 30 mg         Vitals     BP (!) 145/80 (Site: Left Upper Arm, Position: Sitting, Cuff Size: Small Adult)   Pulse 72   SpO2 100%  Wt Readings from Last 3 Encounters:   01/06/23 78 lb (35.4 kg)   12/06/22 88 lb (39.9 kg)   12/05/22 88 lb (39.9 kg)        Physical Exam     General -- no distress, slightly dry oral mucosa  Lungs -- clear  Heart -- S1, S2 heard, JVD - no  Abdomen - soft, non-tender  Extremities --no edema noted  CNS - awake and alert    Labs, Radiology and Tests    Labs -    4/21 10/21 4/22 6/22 9/22 10/22 11/22     Potassium 3.6 4.1 5.0 3.8 4.0 5.1 3.9     BUN  23 37 23 40 39 34     Creatinine  1.2 2.1 1.60 2.4 2.6 2.6     eGFR  44 23 31 20 18 18                 UPCR     1236       UMCR     405                     Renal Ultrasound Scan -- 9/22  8.5 cm right kidney 8.5 cm left kidney       Renal biopsy does not show show any significant abnormalities only mild tubulointerstitial scarring  Assessment    Renal -she has chronic kidney disease stage III with baseline creatinine of 1.6   -Creatinine is getting gradually worse in the last couple of months patient clinically looks dry to some degree  -Says she is drinking a lot of liquids at this time  -Patient had some proteinuria hypercalcemia renal dysfunction and also some anemia  -Renal ultrasound showed possibly mild bilateral hydro send the patient to urology. She has some stents placed and also now with a Diane catheter  -Also seen by oncology. No new labs available anticipate some improvement  -We will obtain them this week  -If renal function continues to worsen she will need a renal replacement therapy and patient is agreeable for back  Electrolytes-within normal limits  Essential hypertension running much better continue midodrine. History of COPD  History of atrial fibrillation on Eliquis and diltiazem  Proteinuria etiology serologic work-up not confirmatory at this time. hypercalcemia seen oncology as well small M spike noted  meds reviewed and D/W patient  Follow-up in 4 months    Tests and orders placed this Encounter   No orders of the defined types were placed in this encounter. Eloisa Matson M.D  [facility].

## 2023-02-13 ENCOUNTER — HOSPITAL ENCOUNTER (OUTPATIENT)
Dept: INFUSION THERAPY | Age: 78
Discharge: HOME | End: 2023-02-13
Payer: MEDICARE

## 2023-02-13 ENCOUNTER — TELEPHONE (OUTPATIENT)
Dept: NEPHROLOGY | Age: 78
End: 2023-02-13

## 2023-02-13 ENCOUNTER — OFFICE VISIT (OUTPATIENT)
Dept: ONCOLOGY | Age: 78
End: 2023-02-13
Payer: MEDICARE

## 2023-02-13 VITALS
HEIGHT: 62 IN | BODY MASS INDEX: 14.54 KG/M2 | RESPIRATION RATE: 20 BRPM | SYSTOLIC BLOOD PRESSURE: 130 MMHG | TEMPERATURE: 97.9 F | DIASTOLIC BLOOD PRESSURE: 72 MMHG | HEART RATE: 68 BPM | OXYGEN SATURATION: 98 % | WEIGHT: 79 LBS

## 2023-02-13 VITALS
DIASTOLIC BLOOD PRESSURE: 72 MMHG | BODY MASS INDEX: 14.54 KG/M2 | SYSTOLIC BLOOD PRESSURE: 130 MMHG | OXYGEN SATURATION: 98 % | RESPIRATION RATE: 20 BRPM | HEIGHT: 62 IN | TEMPERATURE: 97.9 F | WEIGHT: 79 LBS | HEART RATE: 68 BPM

## 2023-02-13 DIAGNOSIS — E83.52 HYPERCALCEMIA: Primary | ICD-10-CM

## 2023-02-13 DIAGNOSIS — E83.52 HYPERCALCEMIA: ICD-10-CM

## 2023-02-13 LAB
BASOPHILS # BLD AUTO: 0 THOU/MM3 (ref 0–0.1)
BASOPHILS NFR BLD AUTO: 1 % (ref 0–3)
BUN BLDP-MCNC: 28 MG/DL (ref 8–26)
CHLORIDE BLD-SCNC: 107 MEQ/L (ref 98–109)
CREAT BLD-MCNC: 1.9 MG/DL (ref 0.5–1.2)
EOSINOPHIL # BLD AUTO: 0.1 THOU/MM3 (ref 0–0.4)
EOSINOPHIL NFR BLD AUTO: 2 % (ref 0–4)
ERYTHROCYTE [DISTWIDTH] IN BLOOD BY AUTOMATED COUNT: 21.4 % (ref 11.5–14.5)
GFR SERPL CREATININE-BSD FRML MDRD: 27 ML/MIN/1.73M2
GLUCOSE BLD-MCNC: 122 MG/DL (ref 70–108)
HCT VFR BLD AUTO: 34.4 % (ref 37–47)
HGB BLD-MCNC: 10.6 GM/DL (ref 12–16)
IMM GRANULOCYTES # BLD AUTO: 0.02 THOU/MM3 (ref 0–0.07)
IMM GRANULOCYTES NFR BLD AUTO: 0 %
IONIZED CALCIUM, WHOLE BLOOD: 1.51 MMOL/L (ref 1.12–1.32)
LYMPHOCYTES # BLD AUTO: 0.9 THOU/MM3 (ref 1–4.8)
LYMPHOCYTES NFR BLD AUTO: 15 % (ref 15–47)
MCH RBC QN AUTO: 25.2 PG (ref 26–33)
MCHC RBC AUTO-ENTMCNC: 30.8 GM/DL (ref 32.2–35.5)
MCV RBC AUTO: 82 FL (ref 81–99)
MONOCYTES # BLD AUTO: 0.5 THOU/MM3 (ref 0.4–1.3)
MONOCYTES NFR BLD AUTO: 9 % (ref 0–12)
NEUTROPHILS NFR BLD AUTO: 74 % (ref 43–75)
PLATELET # BLD AUTO: 199 THOU/MM3 (ref 130–400)
PMV BLD AUTO: 9.7 FL (ref 9.4–12.4)
POTASSIUM BLD-SCNC: 3.3 MEQ/L (ref 3.5–4.9)
RBC # BLD AUTO: 4.21 MILL/MM3 (ref 4.2–5.4)
SEGMENTED NEUTROPHILS ABSOLUTE COUNT: 4.4 THOU/MM3 (ref 1.8–7.7)
SODIUM BLD-SCNC: 138 MEQ/L (ref 138–146)
TOTAL CO2, WHOLE BLOOD: 19 MEQ/L (ref 23–33)
WBC # BLD AUTO: 5.9 THOU/MM3 (ref 4.8–10.8)

## 2023-02-13 PROCEDURE — 80047 BASIC METABLC PNL IONIZED CA: CPT

## 2023-02-13 PROCEDURE — 3017F COLORECTAL CA SCREEN DOC REV: CPT | Performed by: NURSE PRACTITIONER

## 2023-02-13 PROCEDURE — G8427 DOCREV CUR MEDS BY ELIG CLIN: HCPCS | Performed by: NURSE PRACTITIONER

## 2023-02-13 PROCEDURE — 99211 OFF/OP EST MAY X REQ PHY/QHP: CPT

## 2023-02-13 PROCEDURE — 83883 ASSAY NEPHELOMETRY NOT SPEC: CPT

## 2023-02-13 PROCEDURE — G8400 PT W/DXA NO RESULTS DOC: HCPCS | Performed by: NURSE PRACTITIONER

## 2023-02-13 PROCEDURE — 84155 ASSAY OF PROTEIN SERUM: CPT

## 2023-02-13 PROCEDURE — 1123F ACP DISCUSS/DSCN MKR DOCD: CPT | Performed by: NURSE PRACTITIONER

## 2023-02-13 PROCEDURE — 4004F PT TOBACCO SCREEN RCVD TLK: CPT | Performed by: NURSE PRACTITIONER

## 2023-02-13 PROCEDURE — 82784 ASSAY IGA/IGD/IGG/IGM EACH: CPT

## 2023-02-13 PROCEDURE — 86334 IMMUNOFIX E-PHORESIS SERUM: CPT

## 2023-02-13 PROCEDURE — 1090F PRES/ABSN URINE INCON ASSESS: CPT | Performed by: NURSE PRACTITIONER

## 2023-02-13 PROCEDURE — 99213 OFFICE O/P EST LOW 20 MIN: CPT | Performed by: NURSE PRACTITIONER

## 2023-02-13 PROCEDURE — 84165 PROTEIN E-PHORESIS SERUM: CPT

## 2023-02-13 PROCEDURE — 80076 HEPATIC FUNCTION PANEL: CPT

## 2023-02-13 PROCEDURE — G8419 CALC BMI OUT NRM PARAM NOF/U: HCPCS | Performed by: NURSE PRACTITIONER

## 2023-02-13 PROCEDURE — 83615 LACTATE (LD) (LDH) ENZYME: CPT

## 2023-02-13 PROCEDURE — 85025 COMPLETE CBC W/AUTO DIFF WBC: CPT

## 2023-02-13 PROCEDURE — G8484 FLU IMMUNIZE NO ADMIN: HCPCS | Performed by: NURSE PRACTITIONER

## 2023-02-13 NOTE — PATIENT INSTRUCTIONS
Labs pending, will call if abnormal  Return to clinic for follow up in 8 weeks  Notify the office of any new or worsening symptoms

## 2023-02-13 NOTE — PROGRESS NOTES
[id number] [address] [address]  Dept: [phone]  Loc: [phone]       Visit Date:2/13/2023     Vonda Brady is a 78 y.o. female who presents today for:   Chief Complaint   Patient presents with    Follow-up     Hypercalcemia        HPI:   Vonda Brady is a 78 y.o. female Hypercalcemia. The patient is a resident at the [facility] in [facility]. The patient also has a history of CAD, CHF, A-fib, HTN, hyperlipidemia, COPD and anxiety. She follows with nephrology regarding her HERIBERTO/CKD and hyponatremia. 12/05/2022 the patient was initially seen for elevated ionized calcium 1.52  (1.12-1.32). She was previously on calcium plus vitamin D supplements which were discontinued 6 days prior to the visit by nephrology, Dr. Steff Alonso. The patient is confined to a wheelchair or bed the majority of her time. Interval History 2/13/2023: The patient returns for follow-up on her diagnosis of hypercalcemia. She reports she is currently in physical therapy. She denies any recent falls or trauma. GFR 27, BUN elevated at 28 and creatinine elevated at 1.9. Ionized calcium remains elevated at 1.51. She denies any headaches, dizziness or vision changes. No chest pain or SOB. CBC results reveal WBCs 5.9, Hgb 10.6, HCT 34.4% and platelet count 645,777. She denies any abdominal pain or GI issues. She currently drinks approximately 3 small glasses of water daily. No fevers or night sweats. PMH, SH, and FH:  I reviewed the patients medication list and allergy list as noted on the electronic medical record. The PMH, SH and FH were also reviewed as noted on the EMR. Review of Systems:   Review of Systems   Pertinent review of systems noted in HPI, all other ROS negative.    Objective:   Physical Exam   /72 (Site: Right Upper Arm, Position: Sitting, Cuff Size: Medium Adult)   Pulse 68   Temp 97.9 °F (36.6 °C) (Oral)   Resp 20   Ht 5' 2\" (1.575 m)   Wt 79 lb (35.8 kg)   SpO2 98%   BMI 14.45 kg/m²    General appearance: No apparent distress, calm and cooperative. HEENT: Pupils equal, round, and reactive to light. Conjunctivae/corneas clear. Oral mucosa intact  Neck: Supple, with full range of motion. Trachea midline. Respiratory:  Normal respiratory effort. Clear to auscultation, bilaterally without Rales/Wheezes/Rhonchi. Cardiovascular: Regular rate and rhythm with normal S1/S2 without murmurs, rubs or gallops. Abdomen: Soft, non-tender, non-distended with active bowel sounds. Musculoskeletal: No clubbing, cyanosis or edema bilaterally. Skin: Skin color, texture, turgor normal.  No visible rashes or lesions. Neurologic:  Neurovascularly intact without any focal sensory/motor deficits. Psychiatric: Alert and oriented, thought content appropriate, normal insight  Capillary Refill: Brisk,< 3 seconds   Peripheral Pulses: +2 palpable, equal bilaterally       Imaging Studies and Labs:   CBC:   Lab Results   Component Value Date    WBC 5.9 02/13/2023    HGB 10.6 (L) 02/13/2023    HCT 34.4 (L) 02/13/2023    MCV 82 02/13/2023     02/13/2023     BMP:   Lab Results   Component Value Date/Time     02/13/2023 02:32 PM     11/22/2022 12:00 AM    K 3.3 02/13/2023 02:32 PM    K 3.9 11/22/2022 12:00 AM     11/22/2022 12:00 AM    CO2 20 11/22/2022 12:00 AM    BUN 34 11/22/2022 12:00 AM    CREATININE 1.9 02/13/2023 02:32 PM    CREATININE 2.6 11/22/2022 12:00 AM    GLUCOSE 131 11/22/2022 12:00 AM    CALCIUM 10.5 12/05/2022 02:09 PM      LFT:   Lab Results   Component Value Date    ALT 21 12/05/2022    AST 23 12/05/2022    ALKPHOS 111 12/05/2022    BILITOT 0.2 (L) 12/05/2022         Assessment and Plan:   1. Hypercalcemia  - CBC with Auto Differential; Future  - Hepatic Function Panel; Future  - POC PANEL BMP W/IOCA; Future    Return in about 8 weeks (around 4/10/2023).        All patient questions answered. Pt voiced understanding. Patient agreed with treatment plan. Follow up as directed. Patient instructed to call for questions or concerns.        Electronically signed by   GISELLE Kimble CNP

## 2023-02-14 LAB
ALBUMIN SERPL BCG-MCNC: 3.4 G/DL (ref 3.5–5.1)
ALP SERPL-CCNC: 159 U/L (ref 38–126)
ALT SERPL W/O P-5'-P-CCNC: 13 U/L (ref 11–66)
AST SERPL-CCNC: 19 U/L (ref 5–40)
BILIRUB CONJ SERPL-MCNC: < 0.2 MG/DL (ref 0–0.3)
BILIRUB SERPL-MCNC: 0.2 MG/DL (ref 0.3–1.2)
LDH SERPL L TO P-CCNC: 191 U/L (ref 100–190)
PROT SERPL-MCNC: 5.6 G/DL (ref 6.1–8)

## 2023-02-16 LAB — KAPPA/LAMBDA FREE LIGHT CHAINS: NORMAL

## 2023-02-17 LAB — IMMUNOFIXATION WITH QUANT: NORMAL

## 2023-02-20 DIAGNOSIS — E83.52 HYPERCALCEMIA: Primary | ICD-10-CM

## 2023-02-27 LAB
ALBUMIN SERPL-MCNC: 3.2 G/DL
ALP BLD-CCNC: 132 U/L
ALT SERPL-CCNC: 14 U/L
ANION GAP SERPL CALCULATED.3IONS-SCNC: NORMAL MMOL/L
AST SERPL-CCNC: 20 U/L
BILIRUB SERPL-MCNC: 0.3 MG/DL (ref 0.1–1.4)
BUN BLDV-MCNC: 32 MG/DL
CALCIUM SERPL-MCNC: 10.3 MG/DL
CHLORIDE BLD-SCNC: 106 MMOL/L
CO2: 21 MMOL/L
CREAT SERPL-MCNC: 2.1 MG/DL
EGFR: 23
GLUCOSE BLD-MCNC: 84 MG/DL
PHOSPHORUS: 3 MG/DL
POTASSIUM SERPL-SCNC: 4 MMOL/L
PTH INTACT: 82
SODIUM BLD-SCNC: 136 MMOL/L
TOTAL PROTEIN: 5.4
VITAMIN D 25-HYDROXY: 45
VITAMIN D2, 25 HYDROXY: NORMAL
VITAMIN D3,25 HYDROXY: NORMAL

## 2023-03-01 ENCOUNTER — TELEPHONE (OUTPATIENT)
Dept: UROLOGY | Age: 78
End: 2023-03-01

## 2023-03-01 NOTE — TELEPHONE ENCOUNTER
Brian Saleh at [facility] [phone] stated patient keeps pulling on the gomez and wanted to know if they could have orders to discontinue gomez. They have had to change it more frequently and it is secured to her leg. Per Dr Refugio Harvey op note on 01/06/2023 Hydronephrosis likely secondary to urinary retention. Pt poor surgical candidate. Recommend conservative management with monthly cath changes.

## 2023-03-01 NOTE — TELEPHONE ENCOUNTER
Per Dr. Rupert Stephens note would prefer conservative treatment with monthly catheter changes. Patient would need OV to discuss possibility of other options.

## 2023-03-09 ENCOUNTER — OFFICE VISIT (OUTPATIENT)
Dept: UROLOGY | Age: 78
End: 2023-03-09
Payer: MEDICARE

## 2023-03-09 VITALS — HEIGHT: 62 IN | BODY MASS INDEX: 14.54 KG/M2 | WEIGHT: 79 LBS | RESPIRATION RATE: 20 BRPM

## 2023-03-09 DIAGNOSIS — T83.518A PURPLE URINE BAG SYNDROME (HCC): ICD-10-CM

## 2023-03-09 DIAGNOSIS — N13.30 BILATERAL HYDRONEPHROSIS: Primary | ICD-10-CM

## 2023-03-09 DIAGNOSIS — N39.0 RECURRENT UTI: ICD-10-CM

## 2023-03-09 DIAGNOSIS — N39.0 PURPLE URINE BAG SYNDROME (HCC): ICD-10-CM

## 2023-03-09 PROCEDURE — 3017F COLORECTAL CA SCREEN DOC REV: CPT | Performed by: UROLOGY

## 2023-03-09 PROCEDURE — G8428 CUR MEDS NOT DOCUMENT: HCPCS | Performed by: UROLOGY

## 2023-03-09 PROCEDURE — 1090F PRES/ABSN URINE INCON ASSESS: CPT | Performed by: UROLOGY

## 2023-03-09 PROCEDURE — G8400 PT W/DXA NO RESULTS DOC: HCPCS | Performed by: UROLOGY

## 2023-03-09 PROCEDURE — 1123F ACP DISCUSS/DSCN MKR DOCD: CPT | Performed by: UROLOGY

## 2023-03-09 PROCEDURE — G8484 FLU IMMUNIZE NO ADMIN: HCPCS | Performed by: UROLOGY

## 2023-03-09 PROCEDURE — G8419 CALC BMI OUT NRM PARAM NOF/U: HCPCS | Performed by: UROLOGY

## 2023-03-09 PROCEDURE — 4004F PT TOBACCO SCREEN RCVD TLK: CPT | Performed by: UROLOGY

## 2023-03-09 PROCEDURE — 99214 OFFICE O/P EST MOD 30 MIN: CPT | Performed by: UROLOGY

## 2023-03-09 NOTE — PROGRESS NOTES
Marie [address] [place] [id number]  Dept: [phone]  Dept Fax: [phone]  Loc: [address] [facility] Urology Office Note -     Patient:  Liudmila Carlson  YOB: 1945    The patient is a 78 y.o. female who presents today for evaluation of the following problems:   Chief Complaint   Patient presents with    Other     Discuss treatment options     Follow-up        History of Present Illness:    Recurrent uti  Chronic catheter   Foul smelling urine    Bilateral hydronephrosis  Markedly dilated  Had retrogrades recently  Chronic catheter was recommended  Pt has been tugging on gomez bc of \"itching\"      Poor ecog status  O2 dependent        Requested/reviewed records from [facility] office and/or outside physician/EMR    (Patient's old records have been requested, reviewed and pertinent findings summarized in today's note.)    Procedures Today: N/A    Last several PSA's:  No results found for: PSA    Last total testosterone:  No results found for: TESTOSTERONE    Urinalysis today:  No results found for this visit on 03/09/23. Last BUN and creatinine:  Lab Results   Component Value Date    BUN 32 02/27/2023     Lab Results   Component Value Date    CREATININE 2.1 02/27/2023       Imaging Reviewed during this Office Visit:   Jose Dugan MD independently reviewed the images and verified the radiology reports from:    No results found.     PAST MEDICAL, FAMILY AND SOCIAL HISTORY:  Past Medical History:   Diagnosis Date    Anemia     Anemia     Anxiety     Atrial fibrillation (HCC)     CHF (congestive heart failure) (HCC)     COPD (chronic obstructive pulmonary disease) (HCC)     Depression     Gastritis with bleeding     Heart abnormality     Hyperlipidemia     Hypertension     Hypothyroidism     Rheumatic disease of mitral valve      Past Surgical History:   Procedure Laterality Date    CT BIOPSY RENAL  11/17/2022    CT BIOPSY RENAL 11/17/2022 [facility] CT SCAN    CYSTOSCOPY Bilateral 1/6/2023    Cystoscopy Bilatereal Retrograde Pyelogram performed by Bhavana Jiang MD at [address] [place] [address]       Family History   Problem Relation Age of Onset    Cancer Sister      Outpatient Medications Marked as Taking for the 3/9/23 encounter (Office Visit) with Bhavana Jiang MD   Medication Sig Dispense Refill    guaiFENesin (ROBITUSSIN) 100 MG/5ML liquid Take 200 mg by mouth 3 times daily as needed for Cough      ammonium lactate (LAC-HYDRIN) 5 % LOTN lotion Apply 1 application topically as needed Apply to skin folds or web spaces      midodrine (PROAMATINE) 5 MG tablet Take 1 tablet by mouth 3 times daily 90 tablet 3    Acetylcysteine, Nutrient, (N-ACETYL CYSTEINE) 600 MG TABS Take by mouth      pantoprazole (PROTONIX) 40 MG tablet Take by mouth every morning (before breakfast)      sucralfate (CARAFATE) 1 GM tablet Take 1 g by mouth 4 times daily Before meals      ondansetron (ZOFRAN) 4 MG tablet Take 4 mg by mouth every 8 hours as needed for Nausea or Vomiting      OXYGEN Inhale into the lungs      metoprolol tartrate (LOPRESSOR) 25 MG tablet Take 0.5 tablets by mouth 2 times daily 60 tablet 1    albuterol sulfate HFA (PROVENTIL;VENTOLIN;PROAIR) 108 (90 Base) MCG/ACT inhaler Inhale 2 puffs into the lungs every 4 hours as needed for Wheezing      Levothyroxine Sodium 100 MCG CAPS Take 100 mcg by mouth Daily      polyethylene glycol (GLYCOLAX) 17 GM/SCOOP powder Take 17 g by mouth daily as needed      acetaminophen (TYLENOL) 325 MG tablet Take 325 mg by mouth every 4 hours      amiodarone (CORDARONE) 200 MG tablet Take 200 mg by mouth in the morning.       DULoxetine (CYMBALTA) 30 MG extended release capsule Take 30 mg by mouth daily      DULoxetine (CYMBALTA) 60 MG extended release capsule Take 60 mg by mouth daily Takes w 30 mg         Amoxicillin  Social History     Tobacco Use   Smoking Status Every Day   Smokeless Tobacco Never   Tobacco Comments    2 cigarettes a day      (If patient a smoker, smoking cessation counseling offered)   Social History     Substance and Sexual Activity   Alcohol Use None       REVIEW OF SYSTEMS:  Constitutional: negative  Eyes: negative  Respiratory: negative  Cardiovascular: negative  Gastrointestinal: negative  Genitourinary: see HPI  Musculoskeletal: negative  Skin: negative   Neurological: negative  Hematological/Lymphatic: negative  Psychological: negative    Physical Exam:    This a 78 y.o. female  Vitals:    03/09/23 1510   Resp: 20     Body mass index is 14.45 kg/m². Constitutional: Patient in no acute distress; Purple bag syndrome      Assessment and Plan        1. Bilateral hydronephrosis    2. Recurrent UTI    3. Purple urine bag syndrome (HCC)               Plan:      Purple bag syndrome- only treat symptomatic infection  Bl hydronephrosis- f/u renal u/s in 6 mo  Recurrent uti- recommend only treating symptomatic infections to prevent resistance. Did offer to remove catheter today but concern about worsening renal fxn  Pt prefers to keep it          Prescriptions Ordered:  No orders of the defined types were placed in this encounter. Orders Placed:  No orders of the defined types were placed in this encounter.            Estelita Forte MD

## 2023-04-03 ENCOUNTER — TELEPHONE (OUTPATIENT)
Dept: UROLOGY | Age: 78
End: 2023-04-03

## 2023-04-03 NOTE — TELEPHONE ENCOUNTER
Concern for worsening renal function without the gomez catheter secondary to bilateral hydronephrosis. Per Dr. Oziel Carter last note, discussion was had for leaving catheter out vs keeping it in and patient preferred at that time to continue the catheter. Keep gomez catheter unless the patient has had a change in heart regarding this.

## 2023-04-03 NOTE — TELEPHONE ENCOUNTER
Liset Og [phone] left a message stating patient pulled out the gomez on Sunday. Her output has been good. Last seen in office on 03/09/2023    Do you want the gomez reinserted?

## 2023-04-04 NOTE — TELEPHONE ENCOUNTER
Idalmis Mccarthy at [facility] advised to insert gomez catheter unless patient has changed her mind and refuses. She voiced understanding to explain to the patient there is concern for worsening renal function without the gomez catheter secondary to hydronephrosis. Idalmis Mccarthy voiced understanding.

## 2023-04-10 ENCOUNTER — HOSPITAL ENCOUNTER (OUTPATIENT)
Dept: INFUSION THERAPY | Age: 78
Discharge: HOME | End: 2023-04-10
Payer: MEDICARE

## 2023-04-10 VITALS
RESPIRATION RATE: 20 BRPM | OXYGEN SATURATION: 100 % | HEART RATE: 73 BPM | WEIGHT: 75.5 LBS | TEMPERATURE: 97.4 F | HEIGHT: 62 IN | DIASTOLIC BLOOD PRESSURE: 63 MMHG | SYSTOLIC BLOOD PRESSURE: 135 MMHG | BODY MASS INDEX: 13.9 KG/M2

## 2023-04-10 DIAGNOSIS — D63.8 ANEMIA, CHRONIC DISEASE: ICD-10-CM

## 2023-04-10 DIAGNOSIS — E83.52 HYPERCALCEMIA: ICD-10-CM

## 2023-04-10 LAB
ALBUMIN SERPL BCG-MCNC: 3 G/DL (ref 3.5–5.1)
ALP SERPL-CCNC: 226 U/L (ref 38–126)
ALT SERPL W/O P-5'-P-CCNC: 44 U/L (ref 11–66)
AST SERPL-CCNC: 44 U/L (ref 5–40)
BASOPHILS # BLD AUTO: 0 THOU/MM3 (ref 0–0.1)
BASOPHILS NFR BLD AUTO: 1 % (ref 0–3)
BILIRUB CONJ SERPL-MCNC: < 0.2 MG/DL (ref 0–0.3)
BILIRUB SERPL-MCNC: 0.2 MG/DL (ref 0.3–1.2)
BUN BLDP-MCNC: 37 MG/DL (ref 8–26)
CHLORIDE BLD-SCNC: 103 MEQ/L (ref 98–109)
CREAT BLD-MCNC: 2 MG/DL (ref 0.5–1.2)
EOSINOPHIL # BLD AUTO: 0.1 THOU/MM3 (ref 0–0.4)
EOSINOPHIL NFR BLD AUTO: 2 % (ref 0–4)
ERYTHROCYTE [DISTWIDTH] IN BLOOD BY AUTOMATED COUNT: 19.7 % (ref 11.5–14.5)
GFR SERPL CREATININE-BSD FRML MDRD: 25 ML/MIN/1.73M2
GLUCOSE BLD-MCNC: 105 MG/DL (ref 70–108)
HCT VFR BLD AUTO: 28.2 % (ref 37–47)
HGB BLD-MCNC: 8.7 GM/DL (ref 12–16)
IMM GRANULOCYTES # BLD AUTO: 0.04 THOU/MM3 (ref 0–0.07)
IMM GRANULOCYTES NFR BLD AUTO: 1 %
IONIZED CALCIUM, WHOLE BLOOD: 1.53 MMOL/L (ref 1.12–1.32)
LYMPHOCYTES # BLD AUTO: 0.8 THOU/MM3 (ref 1–4.8)
LYMPHOCYTES NFR BLD AUTO: 10 % (ref 15–47)
MCH RBC QN AUTO: 24.9 PG (ref 26–33)
MCHC RBC AUTO-ENTMCNC: 30.9 GM/DL (ref 32.2–35.5)
MCV RBC AUTO: 81 FL (ref 81–99)
MONOCYTES # BLD AUTO: 0.7 THOU/MM3 (ref 0.4–1.3)
MONOCYTES NFR BLD AUTO: 9 % (ref 0–12)
NEUTROPHILS NFR BLD AUTO: 78 % (ref 43–75)
PLATELET # BLD AUTO: 267 THOU/MM3 (ref 130–400)
PMV BLD AUTO: 9.3 FL (ref 9.4–12.4)
POTASSIUM BLD-SCNC: 3.9 MEQ/L (ref 3.5–4.9)
PROT SERPL-MCNC: 5.8 G/DL (ref 6.1–8)
RBC # BLD AUTO: 3.49 MILL/MM3 (ref 4.2–5.4)
SEGMENTED NEUTROPHILS ABSOLUTE COUNT: 6.2 THOU/MM3 (ref 1.8–7.7)
SODIUM BLD-SCNC: 134 MEQ/L (ref 138–146)
TOTAL CO2, WHOLE BLOOD: 21 MEQ/L (ref 23–33)
WBC # BLD AUTO: 7.9 THOU/MM3 (ref 4.8–10.8)

## 2023-04-10 PROCEDURE — 86334 IMMUNOFIX E-PHORESIS SERUM: CPT

## 2023-04-10 PROCEDURE — 80076 HEPATIC FUNCTION PANEL: CPT

## 2023-04-10 PROCEDURE — 85025 COMPLETE CBC W/AUTO DIFF WBC: CPT

## 2023-04-10 PROCEDURE — 82784 ASSAY IGA/IGD/IGG/IGM EACH: CPT

## 2023-04-10 PROCEDURE — 84165 PROTEIN E-PHORESIS SERUM: CPT

## 2023-04-10 PROCEDURE — 83540 ASSAY OF IRON: CPT

## 2023-04-10 PROCEDURE — 84155 ASSAY OF PROTEIN SERUM: CPT

## 2023-04-10 PROCEDURE — 83883 ASSAY NEPHELOMETRY NOT SPEC: CPT

## 2023-04-10 PROCEDURE — 99211 OFF/OP EST MAY X REQ PHY/QHP: CPT

## 2023-04-10 PROCEDURE — 80047 BASIC METABLC PNL IONIZED CA: CPT

## 2023-04-10 PROCEDURE — 82728 ASSAY OF FERRITIN: CPT

## 2023-04-10 PROCEDURE — 83550 IRON BINDING TEST: CPT

## 2023-04-11 PROBLEM — D50.8 IRON DEFICIENCY ANEMIA SECONDARY TO INADEQUATE DIETARY IRON INTAKE: Status: ACTIVE | Noted: 2023-04-11

## 2023-04-11 PROBLEM — D63.8 ANEMIA, CHRONIC DISEASE: Status: ACTIVE | Noted: 2023-04-11

## 2023-04-11 LAB
FERRITIN SERPL IA-MCNC: 64 NG/ML (ref 10–291)
IRON SATN MFR SERPL: 13 % (ref 20–50)
IRON SERPL-MCNC: 30 UG/DL (ref 50–170)
TIBC SERPL-MCNC: 232 UG/DL (ref 171–450)

## 2023-04-13 LAB — KAPPA/LAMBDA FREE LIGHT CHAINS: NORMAL

## 2023-04-14 LAB — IMMUNOFIXATION WITH QUANT: NORMAL

## 2023-04-18 ENCOUNTER — HOSPITAL ENCOUNTER (OUTPATIENT)
Dept: INFUSION THERAPY | Age: 78
Discharge: HOME | End: 2023-04-18
Payer: MEDICARE

## 2023-04-18 VITALS
HEART RATE: 71 BPM | WEIGHT: 96 LBS | BODY MASS INDEX: 17.56 KG/M2 | OXYGEN SATURATION: 78 % | RESPIRATION RATE: 18 BRPM | DIASTOLIC BLOOD PRESSURE: 49 MMHG | SYSTOLIC BLOOD PRESSURE: 110 MMHG | TEMPERATURE: 97.9 F

## 2023-04-18 DIAGNOSIS — D50.8 IRON DEFICIENCY ANEMIA SECONDARY TO INADEQUATE DIETARY IRON INTAKE: Primary | ICD-10-CM

## 2023-04-18 PROCEDURE — 96365 THER/PROPH/DIAG IV INF INIT: CPT

## 2023-04-18 PROCEDURE — 6360000002 HC RX W HCPCS: Performed by: NURSE PRACTITIONER

## 2023-04-18 PROCEDURE — 2580000003 HC RX 258: Performed by: NURSE PRACTITIONER

## 2023-04-18 RX ORDER — HEPARIN SODIUM (PORCINE) LOCK FLUSH IV SOLN 100 UNIT/ML 100 UNIT/ML
500 SOLUTION INTRAVENOUS PRN
Status: CANCELLED | OUTPATIENT
Start: 2023-04-25

## 2023-04-18 RX ORDER — ACETAMINOPHEN 325 MG/1
650 TABLET ORAL
Status: CANCELLED | OUTPATIENT
Start: 2023-04-25

## 2023-04-18 RX ORDER — ALBUTEROL SULFATE 90 UG/1
4 AEROSOL, METERED RESPIRATORY (INHALATION) PRN
Status: CANCELLED | OUTPATIENT
Start: 2023-04-25

## 2023-04-18 RX ORDER — SODIUM CHLORIDE 9 MG/ML
5-250 INJECTION, SOLUTION INTRAVENOUS PRN
Status: CANCELLED | OUTPATIENT
Start: 2023-04-25

## 2023-04-18 RX ORDER — EPINEPHRINE 1 MG/ML
0.3 INJECTION, SOLUTION INTRAMUSCULAR; SUBCUTANEOUS PRN
Status: CANCELLED | OUTPATIENT
Start: 2023-04-25

## 2023-04-18 RX ORDER — ONDANSETRON 2 MG/ML
8 INJECTION INTRAMUSCULAR; INTRAVENOUS
Status: CANCELLED | OUTPATIENT
Start: 2023-04-25

## 2023-04-18 RX ORDER — SODIUM CHLORIDE 9 MG/ML
5-250 INJECTION, SOLUTION INTRAVENOUS PRN
Status: DISCONTINUED | OUTPATIENT
Start: 2023-04-18 | End: 2023-04-19 | Stop reason: HOSPADM

## 2023-04-18 RX ORDER — DIPHENHYDRAMINE HYDROCHLORIDE 50 MG/ML
50 INJECTION INTRAMUSCULAR; INTRAVENOUS
Status: CANCELLED | OUTPATIENT
Start: 2023-04-25

## 2023-04-18 RX ORDER — SODIUM CHLORIDE 9 MG/ML
INJECTION, SOLUTION INTRAVENOUS CONTINUOUS
Status: CANCELLED | OUTPATIENT
Start: 2023-04-25

## 2023-04-18 RX ORDER — SODIUM CHLORIDE 0.9 % (FLUSH) 0.9 %
5-40 SYRINGE (ML) INJECTION PRN
Status: CANCELLED | OUTPATIENT
Start: 2023-04-25

## 2023-04-18 RX ADMIN — SODIUM CHLORIDE 25 ML/HR: 9 INJECTION, SOLUTION INTRAVENOUS at 11:17

## 2023-04-18 RX ADMIN — FERRIC CARBOXYMALTOSE INJECTION 750 MG: 50 INJECTION, SOLUTION INTRAVENOUS at 11:19

## 2023-04-18 NOTE — DISCHARGE INSTRUCTIONS
Please contact your hematologist if you have any questions regarding the injectafer that you received today. Patient instructed if experience any of the symptoms following today's chemotherapy / to notify MD immediately or go to emergency department.     * dizziness/lightheadedness  *acute nausea/vomiting - not relieved with medication  *headache - not relieved from Tylenol/pain medication  *chest pain/pressure  *rash/itching  *shortness of breath        Drink fluids - 48oz fluids daily  Call if develop fever/ chills/ signs or symptoms of infection

## 2023-04-18 NOTE — PROGRESS NOTES
Patient assessed for the following post injectafer:    Dizziness   No  Lightheadedness  No      Acute nausea/vomiting No  Headache   No  Chest pain/pressure  No  Rash/itching   No  Shortness of breath  No    Patient kept for 20 minutes observation post infusion. Patient tolerated injectafer without any complications. Last vital signs:   BP (!) 110/49   Pulse 71   Temp 97.9 °F (36.6 °C) (Oral)   Resp 18   Wt 96 lb (43.5 kg)   SpO2 (!) 78%   BMI 17.56 kg/m²       Patient instructed if experience any of the above symptoms following today's infusion,he/she is to notify MD immediately or go to the emergency department. Discharge instructions given to patient. Verbalizes understanding. off unit via wheelchair accompanied by nurse. Patient had belongings at discharge.  Transportation provided by nursing home

## 2023-04-18 NOTE — PLAN OF CARE
Problem: Safety - Adult  Goal: Free from fall injury  Outcome: Adequate for Discharge  Flowsheets (Taken 4/18/2023 1342)  Free From Fall Injury: Instruct family/caregiver on patient safety  Note: Patient verbalizes understanding of fall precautions. Patient free from falls this visit. Problem: Discharge Planning  Goal: Discharge to home or other facility with appropriate resources  Outcome: Adequate for Discharge  Flowsheets (Taken 4/18/2023 1342)  Discharge to home or other facility with appropriate resources: Identify barriers to discharge with patient and caregiver  Note: Verbalized understanding of discharge instructions, follow-up appointments, and when to call the physician. Care plan reviewed with patient. Patient verbalizes understanding of the plan of care and contribute to goal setting.

## 2023-04-23 DIAGNOSIS — E83.52 HYPERCALCEMIA: Primary | ICD-10-CM

## 2023-04-25 ENCOUNTER — HOSPITAL ENCOUNTER (OUTPATIENT)
Dept: INFUSION THERAPY | Age: 78
Discharge: HOME | End: 2023-04-25
Payer: MEDICARE

## 2023-04-25 VITALS
SYSTOLIC BLOOD PRESSURE: 126 MMHG | OXYGEN SATURATION: 99 % | RESPIRATION RATE: 20 BRPM | TEMPERATURE: 97.7 F | HEART RATE: 67 BPM | DIASTOLIC BLOOD PRESSURE: 61 MMHG

## 2023-04-25 DIAGNOSIS — D50.8 IRON DEFICIENCY ANEMIA SECONDARY TO INADEQUATE DIETARY IRON INTAKE: Primary | ICD-10-CM

## 2023-04-25 PROCEDURE — 2580000003 HC RX 258: Performed by: NURSE PRACTITIONER

## 2023-04-25 PROCEDURE — 96366 THER/PROPH/DIAG IV INF ADDON: CPT

## 2023-04-25 PROCEDURE — 96365 THER/PROPH/DIAG IV INF INIT: CPT

## 2023-04-25 PROCEDURE — 6360000002 HC RX W HCPCS: Performed by: NURSE PRACTITIONER

## 2023-04-25 RX ORDER — HEPARIN SODIUM (PORCINE) LOCK FLUSH IV SOLN 100 UNIT/ML 100 UNIT/ML
500 SOLUTION INTRAVENOUS PRN
OUTPATIENT
Start: 2023-05-02

## 2023-04-25 RX ORDER — SODIUM CHLORIDE 0.9 % (FLUSH) 0.9 %
5-40 SYRINGE (ML) INJECTION PRN
OUTPATIENT
Start: 2023-05-02

## 2023-04-25 RX ORDER — ACETAMINOPHEN 325 MG/1
650 TABLET ORAL
OUTPATIENT
Start: 2023-05-02

## 2023-04-25 RX ORDER — SODIUM CHLORIDE 9 MG/ML
5-250 INJECTION, SOLUTION INTRAVENOUS PRN
OUTPATIENT
Start: 2023-05-02

## 2023-04-25 RX ORDER — SODIUM CHLORIDE 9 MG/ML
5-250 INJECTION, SOLUTION INTRAVENOUS PRN
Status: DISCONTINUED | OUTPATIENT
Start: 2023-04-25 | End: 2023-04-26 | Stop reason: HOSPADM

## 2023-04-25 RX ORDER — SODIUM CHLORIDE 9 MG/ML
INJECTION, SOLUTION INTRAVENOUS CONTINUOUS
OUTPATIENT
Start: 2023-05-02

## 2023-04-25 RX ORDER — SODIUM CHLORIDE 9 MG/ML
5-250 INJECTION, SOLUTION INTRAVENOUS PRN
Status: CANCELLED | OUTPATIENT
Start: 2023-05-02

## 2023-04-25 RX ORDER — ALBUTEROL SULFATE 90 UG/1
4 AEROSOL, METERED RESPIRATORY (INHALATION) PRN
OUTPATIENT
Start: 2023-05-02

## 2023-04-25 RX ORDER — EPINEPHRINE 1 MG/ML
0.3 INJECTION, SOLUTION INTRAMUSCULAR; SUBCUTANEOUS PRN
OUTPATIENT
Start: 2023-05-02

## 2023-04-25 RX ORDER — DIPHENHYDRAMINE HYDROCHLORIDE 50 MG/ML
50 INJECTION INTRAMUSCULAR; INTRAVENOUS
OUTPATIENT
Start: 2023-05-02

## 2023-04-25 RX ORDER — ONDANSETRON 2 MG/ML
8 INJECTION INTRAMUSCULAR; INTRAVENOUS
OUTPATIENT
Start: 2023-05-02

## 2023-04-25 RX ADMIN — SODIUM CHLORIDE 20 ML/HR: 9 INJECTION, SOLUTION INTRAVENOUS at 11:14

## 2023-04-25 RX ADMIN — FERRIC CARBOXYMALTOSE INJECTION 750 MG: 50 INJECTION, SOLUTION INTRAVENOUS at 11:16

## 2023-04-25 NOTE — PLAN OF CARE
Problem: Safety - Adult  Goal: Free from fall injury  Outcome: Adequate for Discharge  Flowsheets (Taken 4/25/2023 1108)  Free From Fall Injury: Instruct family/caregiver on patient safety  Note: Patient verbalizes understanding of fall precautions. Patient free from falls this visit. Problem: Discharge Planning  Goal: Discharge to home or other facility with appropriate resources  Outcome: Adequate for Discharge  Flowsheets (Taken 4/25/2023 1108)  Discharge to home or other facility with appropriate resources: Identify barriers to discharge with patient and caregiver  Note: Verbalized understanding of discharge instructions, follow-up appointments, and when to call the physician. Care plan reviewed with patient. Patient verbalizes understanding of the plan of care and contribute to goal setting.

## 2023-04-25 NOTE — PROGRESS NOTES
Patient assessed for the following post injectafer infusion:    Dizziness   No  Lightheadedness  No      Acute nausea/vomiting No  Headache   No  Chest pain/pressure  No  Rash/itching   No  Shortness of breath  No    Patient kept for 20 minutes observation post infusion. Patient tolerated injectafer infusion without any complications. Last vital signs:   /61   Pulse 67   Temp 97.7 °F (36.5 °C) (Oral)   Resp 20   SpO2 99%       Patient instructed if experience any of the above symptoms following today's infusion, she is to notify MD immediately or go to the emergency department. Discharge instructions given to patient. Verbalizes understanding. Patient wheeled off unit via wheelchair per RN with belongings.

## 2023-04-25 NOTE — DISCHARGE INSTRUCTIONS
Please contact your Oncologist if you have any questions regarding the injectafer that you received today. Patient instructed if experience any of the symptoms following today's injectafer / to notify MD immediately or go to emergency department.     * dizziness/lightheadedness  *acute nausea/vomiting - not relieved with medication  *headache - not relieved from Tylenol/pain medication  *chest pain/pressure  *rash/itching  *shortness of breath        Drink fluids - 48oz fluids daily  Call if develop fever/ chills/ signs or symptoms of infection

## 2023-05-22 ENCOUNTER — HOSPITAL ENCOUNTER (OUTPATIENT)
Dept: INFUSION THERAPY | Age: 78
Discharge: HOME | End: 2023-05-22
Payer: MEDICARE

## 2023-05-22 ENCOUNTER — OFFICE VISIT (OUTPATIENT)
Dept: ONCOLOGY | Age: 78
End: 2023-05-22
Payer: MEDICARE

## 2023-05-22 VITALS
HEART RATE: 74 BPM | SYSTOLIC BLOOD PRESSURE: 98 MMHG | RESPIRATION RATE: 20 BRPM | DIASTOLIC BLOOD PRESSURE: 52 MMHG | TEMPERATURE: 98 F | OXYGEN SATURATION: 93 % | WEIGHT: 96 LBS | HEIGHT: 62 IN | BODY MASS INDEX: 17.66 KG/M2

## 2023-05-22 VITALS
HEART RATE: 74 BPM | DIASTOLIC BLOOD PRESSURE: 52 MMHG | TEMPERATURE: 98 F | HEIGHT: 62 IN | RESPIRATION RATE: 20 BRPM | BODY MASS INDEX: 17.56 KG/M2 | SYSTOLIC BLOOD PRESSURE: 98 MMHG | OXYGEN SATURATION: 93 %

## 2023-05-22 DIAGNOSIS — E83.52 HYPERCALCEMIA: ICD-10-CM

## 2023-05-22 DIAGNOSIS — D50.8 IRON DEFICIENCY ANEMIA SECONDARY TO INADEQUATE DIETARY IRON INTAKE: Primary | ICD-10-CM

## 2023-05-22 DIAGNOSIS — D50.8 IRON DEFICIENCY ANEMIA SECONDARY TO INADEQUATE DIETARY IRON INTAKE: ICD-10-CM

## 2023-05-22 PROCEDURE — G8400 PT W/DXA NO RESULTS DOC: HCPCS | Performed by: NURSE PRACTITIONER

## 2023-05-22 PROCEDURE — 85025 COMPLETE CBC W/AUTO DIFF WBC: CPT

## 2023-05-22 PROCEDURE — 82728 ASSAY OF FERRITIN: CPT

## 2023-05-22 PROCEDURE — 83540 ASSAY OF IRON: CPT

## 2023-05-22 PROCEDURE — G8419 CALC BMI OUT NRM PARAM NOF/U: HCPCS | Performed by: NURSE PRACTITIONER

## 2023-05-22 PROCEDURE — G8427 DOCREV CUR MEDS BY ELIG CLIN: HCPCS | Performed by: NURSE PRACTITIONER

## 2023-05-22 PROCEDURE — 82248 BILIRUBIN DIRECT: CPT

## 2023-05-22 PROCEDURE — 1123F ACP DISCUSS/DSCN MKR DOCD: CPT | Performed by: NURSE PRACTITIONER

## 2023-05-22 PROCEDURE — 80053 COMPREHEN METABOLIC PANEL: CPT

## 2023-05-22 PROCEDURE — 1090F PRES/ABSN URINE INCON ASSESS: CPT | Performed by: NURSE PRACTITIONER

## 2023-05-22 PROCEDURE — 4004F PT TOBACCO SCREEN RCVD TLK: CPT | Performed by: NURSE PRACTITIONER

## 2023-05-22 PROCEDURE — 99214 OFFICE O/P EST MOD 30 MIN: CPT | Performed by: NURSE PRACTITIONER

## 2023-05-22 PROCEDURE — 83550 IRON BINDING TEST: CPT

## 2023-05-22 PROCEDURE — 99211 OFF/OP EST MAY X REQ PHY/QHP: CPT

## 2023-05-22 NOTE — PROGRESS NOTES
[address] [address] [address]  Dept: [phone]  Loc: [phone]       Visit Date:5/22/2023     Lawrence Quintana is a 70 y.o. female who presents today for:   Chief Complaint   Patient presents with    Follow-up     Iron deficiency anemia secondary to inadequate dietary iron intake        HPI:   Lawrence Quintana is a 70 y.o. female  with Hypercalcemia. The patient is a resident at the [facility] in [facility]. The patient also has a history of CAD, CHF, A-fib, HTN, hyperlipidemia, COPD and anxiety. She follows with nephrology regarding her HERIBERTO/CKD and hyponatremia. 12/05/2022 the patient was initially seen for elevated ionized calcium 1.52  (1.12-1.32). She was previously on calcium plus vitamin D supplements which were discontinued 6 days prior to the visit by nephrology, Dr. Majo Maloney. The patient is confined to a wheelchair or bed the majority of her time. 02/13/2023  currently in physical therapy. GFR 27, BUN elevated at 28 and creatinine elevated at 1.9. Ionized calcium remains elevated at 1.51. CBC results revealed WBCs 5.9, Hgb 10.6, HCT 34.4% and platelet count 819,500. She drinks approximately 3 small glasses of water daily. Kappa free light chains elevated at 49.70, lambda free light chains elevated at 45.61 [place]/lambda free light chain ratio within normal limits at 1.09. SPEP/FERNANDO interpretation revealed findings which may be indicative of early monoclonal protein. 04/10/2023 GFR decreased at 25, BUN and creatinine elevated. Iron studies revealed iron level 30, TIBC 232, iron saturation 13% and ferritin level 64. CBC results reveal WBC 7.9, Hgb 8.7, HCT 28.2% and platelet count 512,781.      04/18/2023 She received treatment with Injectafer 750 mg IV x2 doses due to intolerance of oral iron.          Interval History 5/23/2023: The patient returns for follow-up on her diagnosis of

## 2023-05-22 NOTE — PATIENT INSTRUCTIONS
Treatment with Injectafer pending iron study results  Return to clinic for follow up in 8 weeks  Notify the office of any new or worsening symptoms

## 2023-05-23 LAB
ALBUMIN SERPL BCG-MCNC: 3.2 G/DL (ref 3.5–5.1)
ALP SERPL-CCNC: 177 U/L (ref 38–126)
ALT SERPL W/O P-5'-P-CCNC: 37 U/L (ref 11–66)
ANION GAP SERPL CALC-SCNC: 13 MEQ/L (ref 8–16)
ANISOCYTOSIS BLD QL SMEAR: PRESENT
AST SERPL-CCNC: 54 U/L (ref 5–40)
BASOPHILS ABSOLUTE: 0 THOU/MM3 (ref 0–0.1)
BASOPHILS NFR BLD AUTO: 0.3 %
BILIRUB CONJ SERPL-MCNC: < 0.2 MG/DL (ref 0–0.3)
BILIRUB SERPL-MCNC: < 0.2 MG/DL (ref 0.3–1.2)
BUN SERPL-MCNC: 38 MG/DL (ref 7–22)
CALCIUM SERPL-MCNC: 9.8 MG/DL (ref 8.5–10.5)
CHLORIDE SERPL-SCNC: 105 MEQ/L (ref 98–111)
CO2 SERPL-SCNC: 13 MEQ/L (ref 23–33)
CREAT SERPL-MCNC: 1.6 MG/DL (ref 0.4–1.2)
DEPRECATED RDW RBC AUTO: 88.6 FL (ref 35–45)
EOSINOPHIL NFR BLD AUTO: 1.3 %
EOSINOPHILS ABSOLUTE: 0.1 THOU/MM3 (ref 0–0.4)
ERYTHROCYTE [DISTWIDTH] IN BLOOD BY AUTOMATED COUNT: 23.5 % (ref 11.5–14.5)
FERRITIN SERPL IA-MCNC: 1398 NG/ML (ref 10–291)
GFR SERPL CREATININE-BSD FRML MDRD: 33 ML/MIN/1.73M2
GLUCOSE SERPL-MCNC: 161 MG/DL (ref 70–108)
HCT VFR BLD AUTO: 37.9 % (ref 37–47)
HGB BLD-MCNC: 10.6 GM/DL (ref 12–16)
HYPOCHROMIA BLD QL SMEAR: PRESENT
IMM GRANULOCYTES # BLD AUTO: 0.05 THOU/MM3 (ref 0–0.07)
IMM GRANULOCYTES NFR BLD AUTO: 0.6 %
IRON SATN MFR SERPL: 24 % (ref 20–50)
IRON SERPL-MCNC: 48 UG/DL (ref 50–170)
LYMPHOCYTES ABSOLUTE: 0.8 THOU/MM3 (ref 1–4.8)
LYMPHOCYTES NFR BLD AUTO: 9.6 %
MCH RBC QN AUTO: 28.6 PG (ref 26–33)
MCHC RBC AUTO-ENTMCNC: 28 GM/DL (ref 32.2–35.5)
MCV RBC AUTO: 102.4 FL (ref 81–99)
MONOCYTES ABSOLUTE: 0.6 THOU/MM3 (ref 0.4–1.3)
MONOCYTES NFR BLD AUTO: 6.8 %
NEUTROPHILS NFR BLD AUTO: 81.4 %
NRBC BLD AUTO-RTO: 0 /100 WBC
PLATELET # BLD AUTO: 170 THOU/MM3 (ref 130–400)
PLATELET BLD QL SMEAR: ADEQUATE
PMV BLD AUTO: 10.6 FL (ref 9.4–12.4)
POIKILOCYTES: ABNORMAL
POTASSIUM SERPL-SCNC: 3.7 MEQ/L (ref 3.5–5.2)
PROT SERPL-MCNC: 5.9 G/DL (ref 6.1–8)
RBC # BLD AUTO: 3.7 MILL/MM3 (ref 4.2–5.4)
SCAN OF BLOOD SMEAR: NORMAL
SEGMENTED NEUTROPHILS ABSOLUTE COUNT: 7.1 THOU/MM3 (ref 1.8–7.7)
SODIUM SERPL-SCNC: 131 MEQ/L (ref 135–145)
TIBC SERPL-MCNC: 201 UG/DL (ref 171–450)
WBC # BLD AUTO: 8.7 THOU/MM3 (ref 4.8–10.8)

## (undated) DEVICE — GLOVE ORANGE PI 7 1/2   MSG9075

## (undated) DEVICE — OPEN-END FLEXI-TIP URETERAL CATHETER: Brand: FLEXI-TIP

## (undated) DEVICE — SOLUTION IV IRRIG WATER 1000ML POUR BRL 2F7114

## (undated) DEVICE — CYSTO PACK: Brand: MEDLINE INDUSTRIES, INC.

## (undated) DEVICE — GUIDEWIRE URO L150CM DIA0.035IN STIFF NIT HYDRPHLC STR TIP

## (undated) DEVICE — SOLUTION IRRIG 3000ML 0.9% SOD CHL USP UROMATIC PLAS CONT

## (undated) DEVICE — CONE TIP URETERAL CATHETER WITH OPEN-END: Brand: CONE TIP